# Patient Record
Sex: MALE | Race: OTHER | HISPANIC OR LATINO | Employment: FULL TIME | URBAN - METROPOLITAN AREA
[De-identification: names, ages, dates, MRNs, and addresses within clinical notes are randomized per-mention and may not be internally consistent; named-entity substitution may affect disease eponyms.]

---

## 2017-11-21 ENCOUNTER — APPOINTMENT (EMERGENCY)
Dept: RADIOLOGY | Facility: HOSPITAL | Age: 52
DRG: 638 | End: 2017-11-21
Payer: COMMERCIAL

## 2017-11-21 ENCOUNTER — APPOINTMENT (INPATIENT)
Dept: MRI IMAGING | Facility: HOSPITAL | Age: 52
DRG: 638 | End: 2017-11-21
Payer: COMMERCIAL

## 2017-11-21 ENCOUNTER — HOSPITAL ENCOUNTER (INPATIENT)
Facility: HOSPITAL | Age: 52
LOS: 7 days | Discharge: HOME HEALTH | DRG: 638 | End: 2017-11-28
Attending: EMERGENCY MEDICINE | Admitting: INTERNAL MEDICINE
Payer: COMMERCIAL

## 2017-11-21 DIAGNOSIS — R73.9 HYPERGLYCEMIA: ICD-10-CM

## 2017-11-21 DIAGNOSIS — E11.621 DIABETIC FOOT ULCER (HCC): Primary | ICD-10-CM

## 2017-11-21 DIAGNOSIS — M86.9 OSTEOMYELITIS (HCC): ICD-10-CM

## 2017-11-21 DIAGNOSIS — I10 HYPERTENSION: ICD-10-CM

## 2017-11-21 DIAGNOSIS — L97.509 DIABETIC FOOT ULCER (HCC): Primary | ICD-10-CM

## 2017-11-21 DIAGNOSIS — N17.9 ACUTE RENAL FAILURE (ARF) (HCC): ICD-10-CM

## 2017-11-21 PROBLEM — E78.5 HYPERLIPIDEMIA: Status: ACTIVE | Noted: 2017-11-21

## 2017-11-21 PROBLEM — E66.9 OBESITY: Status: ACTIVE | Noted: 2017-11-21

## 2017-11-21 PROBLEM — S99.929A FOOT INJURY: Status: ACTIVE | Noted: 2017-11-21

## 2017-11-21 PROBLEM — E11.9 DIABETES MELLITUS (HCC): Status: ACTIVE | Noted: 2017-11-21

## 2017-11-21 LAB
ANION GAP BLD CALC-SCNC: 14 MMOL/L (ref 4–13)
ANION GAP SERPL CALCULATED.3IONS-SCNC: 8 MMOL/L (ref 4–13)
BACTERIA UR QL AUTO: NORMAL /HPF
BASOPHILS # BLD AUTO: 0.07 THOUSANDS/ΜL (ref 0–0.1)
BASOPHILS NFR BLD AUTO: 1 % (ref 0–1)
BILIRUB UR QL STRIP: NEGATIVE
BUN BLD-MCNC: 25 MG/DL (ref 5–25)
BUN SERPL-MCNC: 17 MG/DL (ref 5–25)
CA-I BLD-SCNC: 1.18 MMOL/L (ref 1.12–1.32)
CALCIUM SERPL-MCNC: 9.3 MG/DL (ref 8.3–10.1)
CHLORIDE BLD-SCNC: 100 MMOL/L (ref 100–108)
CHLORIDE SERPL-SCNC: 99 MMOL/L (ref 100–108)
CLARITY UR: CLEAR
CO2 SERPL-SCNC: 27 MMOL/L (ref 21–32)
COLOR UR: ABNORMAL
CREAT BLD-MCNC: 1.6 MG/DL (ref 0.6–1.3)
CREAT SERPL-MCNC: 1.73 MG/DL (ref 0.6–1.3)
EOSINOPHIL # BLD AUTO: 0.45 THOUSAND/ΜL (ref 0–0.61)
EOSINOPHIL NFR BLD AUTO: 4 % (ref 0–6)
ERYTHROCYTE [DISTWIDTH] IN BLOOD BY AUTOMATED COUNT: 13.7 % (ref 11.6–15.1)
GFR SERPL CREATININE-BSD FRML MDRD: 44 ML/MIN/1.73SQ M
GFR SERPL CREATININE-BSD FRML MDRD: 49 ML/MIN/1.73SQ M
GLUCOSE SERPL-MCNC: 191 MG/DL (ref 65–140)
GLUCOSE SERPL-MCNC: 341 MG/DL (ref 65–140)
GLUCOSE SERPL-MCNC: 354 MG/DL (ref 65–140)
GLUCOSE UR STRIP-MCNC: ABNORMAL MG/DL
HCT VFR BLD AUTO: 36.5 % (ref 36.5–49.3)
HCT VFR BLD CALC: 38 % (ref 36.5–49.3)
HGB BLD-MCNC: 11.4 G/DL (ref 12–17)
HGB BLDA-MCNC: 12.9 G/DL (ref 12–17)
HGB UR QL STRIP.AUTO: NEGATIVE
INR PPP: 1.09 (ref 0.86–1.16)
KETONES UR STRIP-MCNC: NEGATIVE MG/DL
LEUKOCYTE ESTERASE UR QL STRIP: ABNORMAL
LYMPHOCYTES # BLD AUTO: 2.09 THOUSANDS/ΜL (ref 0.6–4.47)
LYMPHOCYTES NFR BLD AUTO: 18 % (ref 14–44)
MCH RBC QN AUTO: 27.6 PG (ref 26.8–34.3)
MCHC RBC AUTO-ENTMCNC: 31.2 G/DL (ref 31.4–37.4)
MCV RBC AUTO: 88 FL (ref 82–98)
MONOCYTES # BLD AUTO: 0.55 THOUSAND/ΜL (ref 0.17–1.22)
MONOCYTES NFR BLD AUTO: 5 % (ref 4–12)
NEUTROPHILS # BLD AUTO: 8.31 THOUSANDS/ΜL (ref 1.85–7.62)
NEUTS SEG NFR BLD AUTO: 72 % (ref 43–75)
NITRITE UR QL STRIP: NEGATIVE
NON-SQ EPI CELLS URNS QL MICRO: NORMAL /HPF
NRBC BLD AUTO-RTO: 0 /100 WBCS
PCO2 BLD: 27 MMOL/L (ref 21–32)
PH UR STRIP.AUTO: 6.5 [PH] (ref 4.5–8)
PLATELET # BLD AUTO: 347 THOUSANDS/UL (ref 149–390)
PLATELET # BLD AUTO: 368 THOUSANDS/UL (ref 149–390)
PMV BLD AUTO: 10 FL (ref 8.9–12.7)
PMV BLD AUTO: 9.9 FL (ref 8.9–12.7)
POTASSIUM BLD-SCNC: 5.1 MMOL/L (ref 3.5–5.3)
POTASSIUM SERPL-SCNC: 4.4 MMOL/L (ref 3.5–5.3)
PROT UR STRIP-MCNC: NEGATIVE MG/DL
PROTHROMBIN TIME: 14.3 SECONDS (ref 12.1–14.4)
RBC # BLD AUTO: 4.13 MILLION/UL (ref 3.88–5.62)
RBC #/AREA URNS AUTO: NORMAL /HPF
SODIUM BLD-SCNC: 136 MMOL/L (ref 136–145)
SODIUM SERPL-SCNC: 134 MMOL/L (ref 136–145)
SP GR UR STRIP.AUTO: 1.01 (ref 1–1.03)
SPECIMEN SOURCE: ABNORMAL
TSH SERPL DL<=0.05 MIU/L-ACNC: 1.93 UIU/ML (ref 0.36–3.74)
UROBILINOGEN UR QL STRIP.AUTO: 0.2 E.U./DL
WBC # BLD AUTO: 11.51 THOUSAND/UL (ref 4.31–10.16)
WBC #/AREA URNS AUTO: NORMAL /HPF

## 2017-11-21 PROCEDURE — 80047 BASIC METABLC PNL IONIZED CA: CPT

## 2017-11-21 PROCEDURE — 87040 BLOOD CULTURE FOR BACTERIA: CPT | Performed by: EMERGENCY MEDICINE

## 2017-11-21 PROCEDURE — 80048 BASIC METABOLIC PNL TOTAL CA: CPT | Performed by: EMERGENCY MEDICINE

## 2017-11-21 PROCEDURE — 96365 THER/PROPH/DIAG IV INF INIT: CPT

## 2017-11-21 PROCEDURE — 81001 URINALYSIS AUTO W/SCOPE: CPT | Performed by: EMERGENCY MEDICINE

## 2017-11-21 PROCEDURE — 85014 HEMATOCRIT: CPT

## 2017-11-21 PROCEDURE — 83036 HEMOGLOBIN GLYCOSYLATED A1C: CPT | Performed by: NURSE PRACTITIONER

## 2017-11-21 PROCEDURE — 87040 BLOOD CULTURE FOR BACTERIA: CPT | Performed by: NURSE PRACTITIONER

## 2017-11-21 PROCEDURE — 82948 REAGENT STRIP/BLOOD GLUCOSE: CPT

## 2017-11-21 PROCEDURE — 85025 COMPLETE CBC W/AUTO DIFF WBC: CPT | Performed by: EMERGENCY MEDICINE

## 2017-11-21 PROCEDURE — 84443 ASSAY THYROID STIM HORMONE: CPT | Performed by: NURSE PRACTITIONER

## 2017-11-21 PROCEDURE — 85049 AUTOMATED PLATELET COUNT: CPT | Performed by: NURSE PRACTITIONER

## 2017-11-21 PROCEDURE — 36415 COLL VENOUS BLD VENIPUNCTURE: CPT | Performed by: EMERGENCY MEDICINE

## 2017-11-21 PROCEDURE — 87081 CULTURE SCREEN ONLY: CPT | Performed by: NURSE PRACTITIONER

## 2017-11-21 PROCEDURE — 73718 MRI LOWER EXTREMITY W/O DYE: CPT

## 2017-11-21 PROCEDURE — C51N1ZZ PLANAR NUCLEAR MEDICINE IMAGING OF RIGHT UPPER EXTREMITY VEINS USING TECHNETIUM 99M (TC-99M): ICD-10-PCS | Performed by: RADIOLOGY

## 2017-11-21 PROCEDURE — 73630 X-RAY EXAM OF FOOT: CPT

## 2017-11-21 PROCEDURE — 85610 PROTHROMBIN TIME: CPT | Performed by: NURSE PRACTITIONER

## 2017-11-21 RX ORDER — INSULIN GLARGINE 100 [IU]/ML
30 INJECTION, SOLUTION SUBCUTANEOUS
Status: DISCONTINUED | OUTPATIENT
Start: 2017-11-21 | End: 2017-11-21

## 2017-11-21 RX ORDER — ONDANSETRON 2 MG/ML
4 INJECTION INTRAMUSCULAR; INTRAVENOUS EVERY 6 HOURS PRN
Status: DISCONTINUED | OUTPATIENT
Start: 2017-11-21 | End: 2017-11-28 | Stop reason: HOSPADM

## 2017-11-21 RX ORDER — SACCHAROMYCES BOULARDII 250 MG
250 CAPSULE ORAL 2 TIMES DAILY
Status: DISCONTINUED | OUTPATIENT
Start: 2017-11-21 | End: 2017-11-28 | Stop reason: HOSPADM

## 2017-11-21 RX ORDER — SODIUM CHLORIDE 9 MG/ML
125 INJECTION, SOLUTION INTRAVENOUS CONTINUOUS
Status: DISCONTINUED | OUTPATIENT
Start: 2017-11-21 | End: 2017-11-26

## 2017-11-21 RX ORDER — HEPARIN SODIUM 5000 [USP'U]/ML
5000 INJECTION, SOLUTION INTRAVENOUS; SUBCUTANEOUS EVERY 8 HOURS SCHEDULED
Status: DISCONTINUED | OUTPATIENT
Start: 2017-11-21 | End: 2017-11-28 | Stop reason: HOSPADM

## 2017-11-21 RX ORDER — INSULIN GLARGINE 100 [IU]/ML
24 INJECTION, SOLUTION SUBCUTANEOUS
Status: DISCONTINUED | OUTPATIENT
Start: 2017-11-21 | End: 2017-11-26

## 2017-11-21 RX ORDER — VANCOMYCIN HYDROCHLORIDE 1 G/200ML
12.5 INJECTION, SOLUTION INTRAVENOUS EVERY 12 HOURS
Status: DISCONTINUED | OUTPATIENT
Start: 2017-11-22 | End: 2017-11-21

## 2017-11-21 RX ADMIN — SODIUM CHLORIDE 1000 ML: 0.9 INJECTION, SOLUTION INTRAVENOUS at 17:15

## 2017-11-21 RX ADMIN — INSULIN LISPRO 15 UNITS: 100 INJECTION, SOLUTION INTRAVENOUS; SUBCUTANEOUS at 17:25

## 2017-11-21 RX ADMIN — Medication 250 MG: at 22:55

## 2017-11-21 RX ADMIN — SODIUM CHLORIDE 125 ML/HR: 0.9 INJECTION, SOLUTION INTRAVENOUS at 22:04

## 2017-11-21 RX ADMIN — CEFEPIME HYDROCHLORIDE 2000 MG: 2 INJECTION, SOLUTION INTRAVENOUS at 16:32

## 2017-11-21 RX ADMIN — HEPARIN SODIUM 5000 UNITS: 5000 INJECTION, SOLUTION INTRAVENOUS; SUBCUTANEOUS at 22:47

## 2017-11-21 RX ADMIN — INSULIN GLARGINE 24 UNITS: 100 INJECTION, SOLUTION SUBCUTANEOUS at 22:48

## 2017-11-21 RX ADMIN — CEFAZOLIN SODIUM 2000 MG: 2 SOLUTION INTRAVENOUS at 22:45

## 2017-11-21 RX ADMIN — INSULIN LISPRO 2 UNITS: 100 INJECTION, SOLUTION INTRAVENOUS; SUBCUTANEOUS at 22:55

## 2017-11-21 RX ADMIN — VANCOMYCIN HYDROCHLORIDE 1250 MG: 5 INJECTION, POWDER, LYOPHILIZED, FOR SOLUTION INTRAVENOUS at 17:17

## 2017-11-21 NOTE — H&P
History and Physical - [facility]    Patient Information: Murlean Nyhan 46 y o  male MRN: [id number]  Unit/Bed#: ED 09 Encounter: [id number]  Admitting Physician: JAS Mathur  PCP: Deep Trevino MD  Date of Admission:  11/21/17    Assessment/Plan:    Hospital Problem List:     Principal Problem: Foot injury  Active Problems:    Diabetes mellitus ([facility] )    Hypertension    Hyperlipidemia    Obesity    Acute kidney injury ([facility] )      Plan for the Primary Problem(s):  · Foot injury:  Punctured wound of plantar aspect of 3rd toe  Onset 9 days ago, 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  · Podiatry consult  · See plan for cellulitis  Plan for Additional Problems:   · Cellulitis:  Right foot, including 2nd, 3rd, 4th, 5th toes secondary to infected puncture wound into the setting of neuropathy and uncontrolled diabetes  Foot x-ray reviewed  Will consider MRI rule out osteomyelitis  Noted mild leukocytosis  · Pt does Not meet SIRS criteria  · Failed outpatient therapy of Keflex, per patient he was started on Keflex with no improvement  · Podiatry consulted for further management and evaluation  · I will consult ID for more input  · Blood cultures pending  · Continue for coverage vancomycin an  cefepime pending MRSA  Diabetes mellitus type 2 insulin dependent: obesity:   Uncontrolled  With complications, neuropathy, LOKESH, he reports blurred vision at times  Patient appears to be noncompliant  He reports has not check his blood sugar in over 2 months  He report noncompliance with dietary intake and lifestyle modification  This was strongly encourage to monitor food intake and weight management  · Nutrition will be consulted  · Monitor blood sugar for times per day  With coverage and long-acting insulin  · Will also monitor A1c  Acute kidney injury:  Present on admission secondary to uncontrolled diabetes    Will initiate gentle hydration if not improved by morning will consider consult of Nephrology  Patient Unknown baseline  Will continue to monitor kidney function  Obesity:  Weight loss therapeutic lifestyle nutritional management encourage  Hyperlipidemia:  Will monitor lipid panel  Hypertension:  Blood pressure elevated will initiate Norvasc and hydralazine  VTE Prophylaxis: Heparin  / sequential compression device   Code Status: Full Code  POLST: There is no POLST form on file for this patient (pre-hospital)    Anticipated Length of Stay:  Patient will be admitted on an Inpatient basis with an anticipated length of stay of greater than 2 midnights  Justification for Hospital Stay:, infected punctured wound,  Cellulitis of right lower extremity, IV antibiotics    Total Time for Visit, including Counseling / Coordination of Care: 1 hour  Greater than 50% of this total time spent on direct patient counseling and coordination of care  Chief Complaint:  Right foot injury    History of Present Illness:    Nisha Carter is a 46 y o , obese male with history of uncontrolled diabetes type 2 insulin dependent, hypertension, hyperlipidemia who presents/sent by his Podiatrist, Dr Bryanna Perry for evaluation of foot injury  Patient report about 9 days ago a 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 punctured his 3rd toe  Patient seen by his PCP was started on Keflex with no improvement  Today he went to see his podiatrist and was told to come to the hospital for further evaluation  He complains of fever and chills  As stated above patient is a noncompliant diabetic  Does not monitor his blood sugar on a regular basis  His wife reports he is noncompliant with meals he has no physical activities  Patient reports he thinks his last A1c was 9  Otherwise denies dizziness, lightheadedness, chest pain, shortness of breath, dysuria, flank pain, he reports fever and chills      Review of Systems:    Review of Systems Constitutional: Positive for chills and fever  Obese   Musculoskeletal: Positive for gait problem  Skin: Positive for wound (Punctured wound)  Right foot erythema  All other systems reviewed and are negative  Past Medical and Surgical History:     Past Medical History:   Diagnosis Date    Diabetes mellitus ([address] )     GERD (gastroesophageal reflux disease)     Hyperlipidemia     Hypertension     Neuropathy        History reviewed  No pertinent surgical history  Meds/Allergies:    Prior to Admission medications    Not on File     I have reviewed home medications with patient personally  Allergies: No Known Allergies    Social History:     Marital Status: /Civil Union   Occupation: Disabled  Patient Pre-hospital Living Situation: Home with family  Patient Pre-hospital Level of Mobility: Independent  Patient Pre-hospital Diet Restrictions: He reports none  Substance Use History:   History   Alcohol Use No     History   Smoking Status    Never Smoker   Smokeless Tobacco    Never Used     History   Drug Use No       Family History:    Father with Cardiac and DM    Physical Exam:     Vitals:   Blood Pressure: (!) 185/95 (11/21/17 1708)  Pulse: 80 (11/21/17 1708)  Temperature: 98 6 °F (37 °C) (11/21/17 1708)  Temp Source: Oral (11/21/17 1708)  Respirations: 19 (11/21/17 1708)  Height: 5' 9" (175 3 cm) (11/21/17 1333)  Weight - Scale: 104 kg (230 lb) (11/21/17 1333)  SpO2: 96 % (11/21/17 1708)    Physical Exam    Additional Data:     Lab Results: I have personally reviewed pertinent reports          Results from last 7 days  Lab Units 11/21/17  1641 11/21/17  1629   WBC Thousand/uL  --  11 51*   HEMOGLOBIN g/dL  --  11 4*   I STAT HEMOGLOBIN g/dl 12 9  --    HEMATOCRIT %  --  36 5   PLATELETS Thousands/uL  --  368   NEUTROS PCT %  --  72   LYMPHS PCT %  --  18   MONOS PCT %  --  5   EOS PCT %  --  4       Results from last 7 days  Lab Units 11/21/17  1641 11/21/17  1629   SODIUM mmol/L  --  134*   POTASSIUM mmol/L  --  4 4   CHLORIDE mmol/L  --  99*   CO2 mmol/L  --  27   BUN mg/dL  --  17   CREATININE mg/dL  --  1 73*   CALCIUM mg/dL  --  9 3   GLUCOSE RANDOM mg/dL  --  354*   GLUCOSE, ISTAT mg/dl 341*  --            Imaging: I have personally reviewed pertinent reports  Xr Foot 3+ Views Right    Result Date: 11/21/2017  Narrative: RIGHT FOOT INDICATION:  Injury, nail through foot  COMPARISON: None VIEWS:  3 IMAGES:  3 FINDINGS: Evaluation of the 2nd, 3rd, 4th and 5th toes is somewhat suboptimal due to flexion and overlying bandage material   There are no findings for acute fracture or osseous destruction  There is an old healed fracture of the 3rd metatarsal distally with bony callus formation  There is question of a lucency in the soft tissues at the 3rd toe which may be related to soft tissue laceration  Gas forming soft tissue infection is not excluded  Correlate clinically  Degenerative spurring present at the 1st metatarsophalangeal joint with subchondral cyst formation  Plantar and retrocalcaneal heel spurs identified  There is also spurring at the tarsal metatarsal joints  Dense vascular calcifications are seen  Impression: 1  Evaluation of the 2nd, 3rd, 4th and 5th toes is somewhat suboptimal due to flexion and overlying bandage material   There are no findings for acute fracture or osseous destruction  No radiographic findings for osteomyelitis  If there is clinical concern for osteomyelitis, consider follow-up with MRI or three-phase bone scan  2   Question of a lucency in the soft tissues at the 3rd toe which may be related to soft tissue laceration  Gas forming soft tissue infection is not excluded  Correlate clinically  Return report was sent to the ER   Workstation performed: [id number]       EKG, Pathology, and Other Studies Reviewed on Admission:   · EKG: Normal Sinus     Allscripts Records Reviewed: Yes     ** Please Note: Dragon 360 Dictation voice to text software may have been used in the creation of this document   **

## 2017-11-21 NOTE — ED PROVIDER NOTES
History  Chief Complaint   Patient presents with    Foot Injury     Pt c/o right foot injury when a nail went through foot  Pt has been seen by foot doctor but not getting better  Pt had abx treament as well  Doctor told him to come here where she can be consulted  HPI  61-year-old male presents emergency department with chief complaint of right foot pain  He stepped on a nail 7th 10 days ago and has not sought care until recently  He states that he was on antibiotics per his podiatrist and his foot has gotten worse  He is diabetic    None       Past Medical History:   Diagnosis Date    Diabetes mellitus ([address] )     GERD (gastroesophageal reflux disease)     Hyperlipidemia     Hypertension     Neuropathy        History reviewed  No pertinent surgical history  History reviewed  No pertinent family history  I have reviewed and agree with the history as documented  Social History   Substance Use Topics    Smoking status: Never Smoker    Smokeless tobacco: Never Used    Alcohol use No        Review of Systems   Constitutional: Negative  HENT: Negative  Respiratory: Negative  Cardiovascular: Negative  Gastrointestinal: Negative  Genitourinary: Negative  Skin: Positive for rash and wound  All other systems reviewed and are negative  Physical Exam  ED Triage Vitals [11/21/17 1332]   Temperature Pulse Respirations Blood Pressure SpO2   98 7 °F (37 1 °C) 79 20 (!) 206/99 98 %      Temp Source Heart Rate Source Patient Position - Orthostatic VS BP Location FiO2 (%)   Oral Monitor Sitting Left arm --      Pain Score       No Pain           Orthostatic Vital Signs  Vitals:    11/21/17 1332 11/21/17 1708   BP: (!) 206/99 (!) 185/95   Pulse: 79 80   Patient Position - Orthostatic VS: Sitting Lying       Physical Exam   Constitutional: He is oriented to person, place, and time  He appears well-developed and well-nourished  HENT:   Head: Normocephalic and atraumatic     Eyes: Conjunctivae and EOM are normal  Pupils are equal, round, and reactive to light  Neck: Normal range of motion  Cardiovascular: Normal rate and regular rhythm  Pulmonary/Chest: Effort normal    Abdominal: Soft  Musculoskeletal:        Feet:    Dehiscent wound between the 3rd and 4th toe extending proximally between 3rd and 4th metatarsal   There is surrounding erythema with cellulitic skin changes on the dorsum of the foot  There is also edema on the dorsum of the foot  Foot is tender to pal   Neurological: He is alert and oriented to person, place, and time  Skin: Rash noted  Nursing note and vitals reviewed  ED Medications  Medications   vancomycin (VANCOCIN) 1,250 mg in sodium chloride 0 9 % 250 mL IVPB (1,250 mg Intravenous New Bag 11/21/17 1717)   sodium chloride 0 9 % bolus 1,000 mL (1,000 mL Intravenous New Bag 11/21/17 1715)   sodium chloride 0 9 % infusion (not administered)   cefepime (MAXIPIME) IVPB (premix) 2,000 mg (0 mg Intravenous Stopped 11/21/17 1712)   insulin lispro (HumaLOG) 100 units/mL subcutaneous injection 15 Units (15 Units Subcutaneous Given 11/21/17 1725)       Diagnostic Studies  Results Reviewed     Procedure Component Value Units Date/Time    Basic metabolic panel [[id number]]  (Abnormal) Collected:  11/21/17 1629    Lab Status:  Final result Specimen:  Blood from Arm, Right Updated:  11/21/17 1648     Sodium 134 (L) mmol/L      Potassium 4 4 mmol/L      Chloride 99 (L) mmol/L      CO2 27 mmol/L      Anion Gap 8 mmol/L      BUN 17 mg/dL      Creatinine 1 73 (H) mg/dL      Glucose 354 (H) mg/dL      Calcium 9 3 mg/dL      eGFR 44 ml/min/1 73sq m     Narrative:         National Kidney Disease Education Program recommendations are as follows:  GFR calculation is accurate only with a steady state creatinine  Chronic Kidney disease less than 60 ml/min/1 73 sq  meters  Kidney failure less than 15 ml/min/1 73 sq  meters      POCT Chem 8+ [[id number]]  (Abnormal) Collected: 11/21/17 1641    Lab Status:  Final result Updated:  11/21/17 1646     SODIUM, I-STAT 136 mmol/l      Potassium, i-STAT 5 1 mmol/L      Chloride, istat 100 mmol/L      CO2, i-STAT 27 mmol/L      Anion Gap, Istat 14 (H) mmol/L      Calcium, Ionized i-STAT 1 18 mmol/L      BUN, I-STAT 25 mg/dl      Creatinine, i-STAT 1 6 (H) mg/dl      eGFR 49 ml/min/1 73sq m      Glucose, i-STAT 341 (H) mg/dl      Hct, i-STAT 38 %      Hgb, i-STAT 12 9 g/dl      Specimen Type VENOUS    CBC and differential [[id number]]  (Abnormal) Collected:  11/21/17 1629    Lab Status:  Final result Specimen:  Blood from Arm, Right Updated:  11/21/17 1638     WBC 11 51 (H) Thousand/uL      RBC 4 13 Million/uL      Hemoglobin 11 4 (L) g/dL      Hematocrit 36 5 %      MCV 88 fL      MCH 27 6 pg      MCHC 31 2 (L) g/dL      RDW 13 7 %      MPV 10 0 fL      Platelets 801 Thousands/uL      nRBC 0 /100 WBCs      Neutrophils Relative 72 %      Lymphocytes Relative 18 %      Monocytes Relative 5 %      Eosinophils Relative 4 %      Basophils Relative 1 %      Neutrophils Absolute 8 31 (H) Thousands/µL      Lymphocytes Absolute 2 09 Thousands/µL      Monocytes Absolute 0 55 Thousand/µL      Eosinophils Absolute 0 45 Thousand/µL      Basophils Absolute 0 07 Thousands/µL     Blood culture #2 [[id number]] Collected:  11/21/17 1633    Lab Status: In process Specimen:  Blood from Arm, Left Updated:  11/21/17 1638    Blood culture #1 [[id number]] Collected:  11/21/17 1629    Lab Status: In process Specimen:  Blood from Arm, Right Updated:  11/21/17 1633    UA w Reflex to Microscopic [[id number]]     Lab Status:  No result Specimen:  Urine                  XR foot 3+ views RIGHT   ED Interpretation by Luisa Tao DO (11/21 1642)   Recent periosteum surrounding 3rd metatarsal consistent with osteomyelitis      Final Result by Cindy Duarte MD (11/21 1659)      1    Evaluation of the 2nd, 3rd, 4th and 5th toes is somewhat suboptimal due to flexion and overlying bandage material   There are no findings for acute fracture or osseous destruction  No radiographic findings for osteomyelitis  If there is clinical    concern for osteomyelitis, consider follow-up with MRI or three-phase bone scan  2   Question of a lucency in the soft tissues at the 3rd toe which may be related to soft tissue laceration  Gas forming soft tissue infection is not excluded  Correlate clinically  Return report was sent to the ER  Workstation performed: [id number]                    Procedures  Procedures       Phone Contacts  ED Phone Contact    ED Course  ED Course         Podiatrist, Dr Aline Ybarra, referred patient here per patient  I attempted reaching patient through her office twice and could not reach her  The  service does not have a phone number for paging  I placed routine consult for her  MDM  Number of Diagnoses or Management Options  Acute renal failure (ARF) ([address] ): new and requires workup  Diabetic foot ulcer ([address] ): new and requires workup  Hyperglycemia: established and worsening  Hypertension: established and worsening  Diagnosis management comments: CBC, BMP were ordered and hyperglycemia was evident  There is no evidence of acidosis  Blood cultures were also ordered prior to antibiotic administration for dehiscent wound and likely osteomyelitis  Vancomycin and cefepime were ordered  In addition, 1 L normal saline and 15 units of Humalog were given to address is hyperglycemia  X-ray of right foot revealed elevation of periosteum along 3rd metatarsal consistent with osteomyelitis         Amount and/or Complexity of Data Reviewed  Clinical lab tests: ordered and reviewed  Tests in the radiology section of CPT®: ordered and reviewed      CritCare Time    Disposition  Final diagnoses:   Diabetic foot ulcer ([address] )   Acute renal failure (ARF) ([address] )   Hyperglycemia   Hypertension   Osteomyelitis ([address] )     Time reflects when diagnosis was documented in both MDM as applicable and the Disposition within this note     Time User Action Codes Description Comment    11/21/2017  5:11 PM Leartis Christofer Add [M27 434,  L97 509] Diabetic foot ulcer ([facility] )     11/21/2017  5:12 PM Nappe, Shelby Corderos Add [N17 9] Acute renal failure (ARF) ([facility] )     11/21/2017  5:12 PM Nappe, Kenlucia Corderos Add [R73 9] Hyperglycemia     11/21/2017  5:12 PM Nappe, Kenlucia Chiles Add [I10] Hypertension     11/21/2017  5:58 PM Nappe, Kenlucia Chiles Add [M86 9] [facility]       ED Disposition     ED Disposition Condition Comment    Admit  Case was discussed with general medicne and the patient's admission status was agreed to be Admission Status: inpatient status to the service of Dr Margot Snowden          Follow-up Information    None       Patient's Medications    No medications on file     No discharge procedures on file      ED Provider  Electronically Signed by           Willam Sousa DO  11/21/17 5478

## 2017-11-22 PROBLEM — L03.031 CELLULITIS AND ABSCESS OF TOE OF RIGHT FOOT: Status: ACTIVE | Noted: 2017-11-22

## 2017-11-22 PROBLEM — L02.611 CELLULITIS AND ABSCESS OF TOE OF RIGHT FOOT: Status: ACTIVE | Noted: 2017-11-22

## 2017-11-22 LAB
ANION GAP SERPL CALCULATED.3IONS-SCNC: 9 MMOL/L (ref 4–13)
BUN SERPL-MCNC: 13 MG/DL (ref 5–25)
CALCIUM SERPL-MCNC: 8.6 MG/DL (ref 8.3–10.1)
CHLORIDE SERPL-SCNC: 106 MMOL/L (ref 100–108)
CHOLEST SERPL-MCNC: 129 MG/DL (ref 50–200)
CO2 SERPL-SCNC: 24 MMOL/L (ref 21–32)
CREAT SERPL-MCNC: 1.35 MG/DL (ref 0.6–1.3)
ERYTHROCYTE [DISTWIDTH] IN BLOOD BY AUTOMATED COUNT: 13.4 % (ref 11.6–15.1)
EST. AVERAGE GLUCOSE BLD GHB EST-MCNC: 220 MG/DL
GFR SERPL CREATININE-BSD FRML MDRD: 60 ML/MIN/1.73SQ M
GLUCOSE SERPL-MCNC: 147 MG/DL (ref 65–140)
GLUCOSE SERPL-MCNC: 159 MG/DL (ref 65–140)
GLUCOSE SERPL-MCNC: 170 MG/DL (ref 65–140)
GLUCOSE SERPL-MCNC: 196 MG/DL (ref 65–140)
GLUCOSE SERPL-MCNC: 260 MG/DL (ref 65–140)
HBA1C MFR BLD: 9.3 % (ref 4.2–6.3)
HCT VFR BLD AUTO: 34.1 % (ref 36.5–49.3)
HDLC SERPL-MCNC: 27 MG/DL (ref 40–60)
HGB BLD-MCNC: 10.7 G/DL (ref 12–17)
LDLC SERPL CALC-MCNC: 74 MG/DL (ref 0–100)
MAGNESIUM SERPL-MCNC: 2.1 MG/DL (ref 1.6–2.6)
MCH RBC QN AUTO: 27.6 PG (ref 26.8–34.3)
MCHC RBC AUTO-ENTMCNC: 31.4 G/DL (ref 31.4–37.4)
MCV RBC AUTO: 88 FL (ref 82–98)
PLATELET # BLD AUTO: 340 THOUSANDS/UL (ref 149–390)
PMV BLD AUTO: 10 FL (ref 8.9–12.7)
POTASSIUM SERPL-SCNC: 3.9 MMOL/L (ref 3.5–5.3)
RBC # BLD AUTO: 3.88 MILLION/UL (ref 3.88–5.62)
SODIUM SERPL-SCNC: 139 MMOL/L (ref 136–145)
TRIGL SERPL-MCNC: 139 MG/DL
WBC # BLD AUTO: 10.68 THOUSAND/UL (ref 4.31–10.16)

## 2017-11-22 PROCEDURE — 80061 LIPID PANEL: CPT | Performed by: NURSE PRACTITIONER

## 2017-11-22 PROCEDURE — 85027 COMPLETE CBC AUTOMATED: CPT | Performed by: NURSE PRACTITIONER

## 2017-11-22 PROCEDURE — 83735 ASSAY OF MAGNESIUM: CPT | Performed by: NURSE PRACTITIONER

## 2017-11-22 PROCEDURE — 82948 REAGENT STRIP/BLOOD GLUCOSE: CPT

## 2017-11-22 PROCEDURE — 99285 EMERGENCY DEPT VISIT HI MDM: CPT

## 2017-11-22 PROCEDURE — 87186 SC STD MICRODIL/AGAR DIL: CPT | Performed by: PODIATRIST

## 2017-11-22 PROCEDURE — 87147 CULTURE TYPE IMMUNOLOGIC: CPT | Performed by: PODIATRIST

## 2017-11-22 PROCEDURE — 87070 CULTURE OTHR SPECIMN AEROBIC: CPT | Performed by: PODIATRIST

## 2017-11-22 PROCEDURE — 80048 BASIC METABOLIC PNL TOTAL CA: CPT | Performed by: NURSE PRACTITIONER

## 2017-11-22 PROCEDURE — 36415 COLL VENOUS BLD VENIPUNCTURE: CPT | Performed by: NURSE PRACTITIONER

## 2017-11-22 PROCEDURE — 87205 SMEAR GRAM STAIN: CPT | Performed by: PODIATRIST

## 2017-11-22 RX ORDER — METRONIDAZOLE 500 MG/1
500 TABLET ORAL EVERY 8 HOURS SCHEDULED
Status: DISCONTINUED | OUTPATIENT
Start: 2017-11-22 | End: 2017-11-28 | Stop reason: HOSPADM

## 2017-11-22 RX ORDER — METOPROLOL TARTRATE 50 MG/1
50 TABLET, FILM COATED ORAL ONCE
Status: DISCONTINUED | OUTPATIENT
Start: 2017-11-22 | End: 2017-11-28 | Stop reason: HOSPADM

## 2017-11-22 RX ADMIN — METRONIDAZOLE 500 MG: 500 TABLET ORAL at 22:04

## 2017-11-22 RX ADMIN — Medication 250 MG: at 17:06

## 2017-11-22 RX ADMIN — CEFAZOLIN SODIUM 2000 MG: 2 SOLUTION INTRAVENOUS at 06:01

## 2017-11-22 RX ADMIN — METRONIDAZOLE 500 MG: 500 TABLET ORAL at 16:50

## 2017-11-22 RX ADMIN — Medication 250 MG: at 08:52

## 2017-11-22 RX ADMIN — HEPARIN SODIUM 5000 UNITS: 5000 INJECTION, SOLUTION INTRAVENOUS; SUBCUTANEOUS at 14:23

## 2017-11-22 RX ADMIN — INSULIN LISPRO 2 UNITS: 100 INJECTION, SOLUTION INTRAVENOUS; SUBCUTANEOUS at 17:04

## 2017-11-22 RX ADMIN — INSULIN LISPRO 6 UNITS: 100 INJECTION, SOLUTION INTRAVENOUS; SUBCUTANEOUS at 08:51

## 2017-11-22 RX ADMIN — INSULIN GLARGINE 24 UNITS: 100 INJECTION, SOLUTION SUBCUTANEOUS at 22:05

## 2017-11-22 RX ADMIN — METOPROLOL TARTRATE 25 MG: 50 TABLET, FILM COATED ORAL at 16:50

## 2017-11-22 RX ADMIN — INSULIN LISPRO 1 UNITS: 100 INJECTION, SOLUTION INTRAVENOUS; SUBCUTANEOUS at 11:58

## 2017-11-22 RX ADMIN — METOPROLOL TARTRATE 25 MG: 25 TABLET, FILM COATED ORAL at 17:00

## 2017-11-22 RX ADMIN — INSULIN LISPRO 3 UNITS: 100 INJECTION, SOLUTION INTRAVENOUS; SUBCUTANEOUS at 22:05

## 2017-11-22 RX ADMIN — CEFEPIME HYDROCHLORIDE 2000 MG: 2 INJECTION, SOLUTION INTRAVENOUS at 16:50

## 2017-11-22 RX ADMIN — HEPARIN SODIUM 5000 UNITS: 5000 INJECTION, SOLUTION INTRAVENOUS; SUBCUTANEOUS at 22:04

## 2017-11-22 RX ADMIN — HEPARIN SODIUM 5000 UNITS: 5000 INJECTION, SOLUTION INTRAVENOUS; SUBCUTANEOUS at 06:01

## 2017-11-22 NOTE — CONSULTS
Consultation - Infectious Disease   Grant Piña 46 y o  male MRN: [id number]  Unit/Bed#: ANTHONY Encounter: [id number]      IMPRESSION & RECOMMENDATIONS:   Impression/Recommendations: This is a 46 y o  male, with noncompliant dm, suffered a nail puncture wound to his foot right foot 10 days ago  He did not seek care at 1st but was initially placed on Keflex  He is now admitted for worsening foot infection  He has no fever but has mild leukocytosis  He is clinically and systemically well, without sepsis or systemic toxicity  1   Right foot cellulitis, secondary to dirty nail puncture wound  In this kind of injury, pathogens involved include skin pathogens, environmental pathogens on the nail, and Pseudomonas residing in the sole of shoes  His antibiotic regimen will need to be broadened to cover these pathogens  Fortunately, patient is clinically and systemically well, without sepsis or systemic toxicity  Change antibiotic to high-dose IV cefepime and p o  Flagyl  Follow up on wound culture  Follow-up on blood cultures  Serial foot exam     2   Probable osteomyelitis of right 3rd toe  With the dirty nail puncture injury, there is high probability of osteomyelitis  MRI findings are equivocal but certainly suggestive of osteomyelitis  Podiatry evaluation noted  Plan for Ceretec noted  If osteomyelitis is present, probability of cure with antibiotic alone is extremely low in this noncompliant DM patient  In this case, I would recommend bone resection/amputation  Follow-up on Ceretec scan  Recommend bone resection (toe amputation versus ray resection versus TMA) if osteomyelitis is present  3   Dirty nail puncture injury  Pathogen considerations above  Patient is also at high risk for tetanus  He did receive TD booster last week  4   LOKESH, superimposed on CKD  This is most likely secondary to acute infection  Creatinine is improved overnight    Antibiotic dosages adjusted accordingly  Monitor creatinine  5   Noncompliant DM  Hemoglobin A1c on admission is 9 3  This clearly contributes to infection development  This will also contribute to poor healing potential   Management per Medicine Service  Discussed with patient in great detail regarding the need to control blood sugar to avoid future infections  6   Neuropathy  This, together with noncompliance, puts patient at very high risk for future limb loss from injuries  Discussed with patient in detail regarding the above plan  Discussed with Dr Venus Tao from [facility] service earlier  Thank you for this consultation  We will follow along with you  HISTORY OF PRESENT ILLNESS:  Reason for Consult:  Right foot cellulitis  HPI: Cristal Decker is a 46 y o  male, with noncompliant DM, 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, 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, 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  Patient actually does not recall exactly when this event occur  His wife notice today on his foot 10 days ago  Patient has neuropathy and has no sensation is foot  In any event, patient did not seek see Care initially  He had a routine appointment with his PCP 2 days later  He was started on Keflex  Due to lack of improvement, patient saw his podiatrist yesterday  He was directly referred to the ER for admission  On presentation, patient had mild leukocytosis but no fever  He was admitted and started on IV cefazolin  We asked to evaluate the patient  As stated above, patient has not compliant DM  He does not monitor his blood sugar  He has diet indiscretion  He does not take his medications  Patient states that he has had neuropathy for many years  Patient denies any prior infection or surgery in his right foot  Patient did receive tetanus booster at his PCP's office last week  Patient denies any fever/chills at home      REVIEW OF SYSTEMS:  A complete 12 point system-based review of systems is otherwise negative  PAST MEDICAL HISTORY:  Past Medical History:   Diagnosis Date    Diabetes mellitus ([address] )     GERD (gastroesophageal reflux disease)     Hyperlipidemia     Hypertension     Neuropathy      History reviewed  No pertinent surgical history  Problem list reviewed  FAMILY HISTORY:  Non-contributory    SOCIAL HISTORY:  History   Alcohol Use No     History   Drug Use No     History   Smoking Status    Never Smoker   Smokeless Tobacco    Never Used       ALLERGIES:  No Known Allergies    MEDICATIONS:  All current active medications have been reviewed  Patient is currently on IV cefazolin  PHYSICAL EXAM:  Vitals:  HR:  [75-81] 81  Resp:  [13-21] 18  BP: (162-192)/(81-95) 177/90  SpO2:  [96 %-98 %] 98 %  Temp (24hrs), Av 8 °F (37 1 °C), Min:98 6 °F (37 °C), Max:98 9 °F (37 2 °C)  Current: Temperature: 98 9 °F (37 2 °C)     Physical Exam:  General:  Well-nourished, well-developed, in no acute distress  Awake, alert and oriented x 3  Eyes:  Conjunctive clear with no hemorrhages or effusions  Oropharynx:  No ulcers, no lesions, pharynx benign, no tonsillitis  Neck:  Supple, no lymphadenopathy, no mass, nontender  Lungs:  Expansion symmetric, no rales, no wheezing, no accessory muscle use  Cardiac:  Regular rate and rhythm, normal S1, normal S2, no murmurs  Abdomen:  Soft, nondistended, non-tender, no HSM  Extremities:  Right foot with mild edema distally  There is an ulcer had right 3rd space with some undermining  Mild surrounding erythema  No purulence  No fluctuance  No tenderness  Skin:  No rashes, no ulcers  Neurological:  Moves all four extremities spontaneously, decreased sensation    LABS, IMAGING, & OTHER STUDIES:  Lab Results:  I have personally reviewed pertinent labs      Results from last 7 days  Lab Units 17  0449 17  1641 17  1629   SODIUM mmol/L 139  --  134*   POTASSIUM mmol/L 3 9  --  4 4   CHLORIDE mmol/L 106  --  99*   CO2 mmol/L 24  --  27   ANION GAP mmol/L 9  --  8   BUN mg/dL 13  --  17   CREATININE mg/dL 1 35*  --  1 73*   EGFR ml/min/1 73sq m 60 49 44   GLUCOSE RANDOM mg/dL 159*  --  354*   GLUCOSE, ISTAT mg/dl  --  341*  --    CALCIUM mg/dL 8 6  --  9 3       Results from last 7 days  Lab Units 11/22/17  0448 11/21/17  2147 11/21/17  1641 11/21/17  1629   WBC Thousand/uL 10 68*  --   --  11 51*   HEMOGLOBIN g/dL 10 7*  --   --  11 4*   I STAT HEMOGLOBIN g/dl  --   --  12 9  --    PLATELETS Thousands/uL 340 347  --  368           Imaging Studies:   I have personally reviewed pertinent imaging study reports and images in PACS  Right foot x-ray reviewed personally  No obvious bony abnormalities  Right foot MRI reviewed personally  There is increased marrow edema in right 3rd through 5th toes  EKG, Pathology, and Other Studies:   I have personally reviewed pertinent reports

## 2017-11-22 NOTE — PLAN OF CARE
Problem: PAIN - ADULT  Goal: Verbalizes/displays adequate comfort level or baseline comfort level  Interventions:  - Encourage patient to monitor pain and request assistance  - Assess pain using appropriate pain scale  - Administer analgesics based on type and severity of pain and evaluate response  - Implement non-pharmacological measures as appropriate and evaluate response  - Consider cultural and social influences on pain and pain management  - Notify physician/advanced practitioner if interventions unsuccessful or patient reports new pain   Outcome: Progressing      Problem: INFECTION - ADULT  Goal: Absence or prevention of progression during hospitalization  INTERVENTIONS:  - Assess and monitor for signs and symptoms of infection  - Monitor lab/diagnostic results  - Monitor all insertion sites, i e  indwelling lines, tubes, and drains  - Monitor endotracheal (as able) and nasal secretions for changes in amount and color  - [place] appropriate cooling/warming therapies per order  - Administer medications as ordered  - Instruct and encourage patient and family to use good hand hygiene technique  - Identify and instruct in appropriate isolation precautions for identified infection/condition   Outcome: Progressing    Goal: Absence of fever/infection during neutropenic period  INTERVENTIONS:  - Monitor WBC  - Implement neutropenic guidelines   Outcome: Progressing      Problem: SAFETY ADULT  Goal: Patient will remain free of falls  INTERVENTIONS:  - Assess patient frequently for physical needs  -  Identify cognitive and physical deficits and behaviors that affect risk of falls    -  [place] fall precautions as indicated by assessment   - Educate patient/family on patient safety including physical limitations  - Instruct patient to call for assistance with activity based on assessment  - Modify environment to reduce risk of injury  - Consider OT/PT consult to assist with strengthening/mobility    Outcome: Progressing    Goal: Maintain or return to baseline ADL function  INTERVENTIONS:  -  Assess patient's ability to carry out ADLs; assess patient's baseline for ADL function and identify physical deficits which impact ability to perform ADLs (bathing, care of mouth/teeth, toileting, grooming, dressing, etc )  - Assess/evaluate cause of self-care deficits   - Assess range of motion  - Assess patient's mobility; develop plan if impaired  - Assess patient's need for assistive devices and provide as appropriate  - Encourage maximum independence but intervene and supervise when necessary  ¯ Involve family in performance of ADLs  ¯ Assess for home care needs following discharge   ¯ Request OT consult to assist with ADL evaluation and planning for discharge  ¯ Provide patient education as appropriate   Outcome: Progressing    Goal: Maintain or return mobility status to optimal level  INTERVENTIONS:  - Assess patient's baseline mobility status (ambulation, transfers, stairs, etc )    - Identify cognitive and physical deficits and behaviors that affect mobility  - Identify mobility aids required to assist with transfers and/or ambulation (gait belt, sit-to-stand, lift, walker, cane, etc )  - [place] fall precautions as indicated by assessment  - Record patient progress and toleration of activity level on Mobility SBAR; progress patient to next Phase/Stage  - Instruct patient to call for assistance with activity based on assessment  - Request Rehabilitation consult to assist with strengthening/weightbearing, etc    Outcome: Progressing      Problem: DISCHARGE PLANNING  Goal: Discharge to home or other facility with appropriate resources  INTERVENTIONS:  - Identify barriers to discharge w/patient and caregiver  - Arrange for needed discharge resources and transportation as appropriate  - Identify discharge learning needs (meds, wound care, etc )  - Arrange for interpretive services to assist at discharge as needed  - Refer to Case Management Department for coordinating discharge planning if the patient needs post-hospital services based on physician/advanced practitioner order or complex needs related to functional status, cognitive ability, or social support system   Outcome: Progressing      Problem: Knowledge Deficit  Goal: Patient/family/caregiver demonstrates understanding of disease process, treatment plan, medications, and discharge instructions  Complete learning assessment and assess knowledge base    Interventions:  - Provide teaching at level of understanding  - Provide teaching via preferred learning methods   Outcome: Progressing      Problem: SKIN/TISSUE INTEGRITY - ADULT  Goal: Skin integrity remains intact  INTERVENTIONS  - Identify patients at risk for skin breakdown  - Assess and monitor skin integrity  - Assess and monitor nutrition and hydration status  - Monitor labs (i e  albumin)  - Assess for incontinence   - Turn and reposition patient  - Assist with mobility/ambulation  - Relieve pressure over bony prominences  - Avoid friction and shearing  - Provide appropriate hygiene as needed including keeping skin clean and dry  - Evaluate need for skin moisturizer/barrier cream  - Collaborate with interdisciplinary team (i e  Nutrition, Rehabilitation, etc )   - Patient/family teaching   Outcome: Progressing    Goal: Incision(s), wounds(s) or drain site(s) healing without S/S of infection  INTERVENTIONS  - Assess and document risk factors for skin impairment   - Assess and document dressing, incision, wound bed, drain sites and surrounding tissue  - Initiate Nutrition services consult and/or wound management as needed   Outcome: Progressing    Goal: Oral mucous membranes remain intact  INTERVENTIONS  - Assess oral mucosa and hygiene practices  - Implement preventative oral hygiene regimen  - Implement oral medicated treatments as ordered  - Initiate Nutrition services referral as needed   Outcome: Progressing      Problem: Potential for Falls  Goal: Patient will remain free of falls  INTERVENTIONS:  - Assess patient frequently for physical needs  -  Identify cognitive and physical deficits and behaviors that affect risk of falls    -  [place] fall precautions as indicated by assessment   - Educate patient/family on patient safety including physical limitations  - Instruct patient to call for assistance with activity based on assessment  - Modify environment to reduce risk of injury  - Consider OT/PT consult to assist with strengthening/mobility    Outcome: Progressing      Problem: METABOLIC, FLUID AND ELECTROLYTES - ADULT  Goal: Glucose maintained within target range  INTERVENTIONS:  - Monitor Blood Glucose as ordered  - Assess for signs and symptoms of hyperglycemia and hypoglycemia  - Administer ordered medications to maintain glucose within target range  - Assess nutritional intake and initiate nutrition service referral as needed  Outcome: Progressing

## 2017-11-22 NOTE — ASSESSMENT & PLAN NOTE
· Type 2 insulin dependent  · On control, A1c 9 3  · As per patient's wife yesterday patient is not cognizant of his dietary intake  · Will continue monitor, continue Lantus and SSI

## 2017-11-22 NOTE — PLAN OF CARE
DISCHARGE PLANNING     Discharge to home or other facility with appropriate resources Progressing        INFECTION - ADULT     Absence or prevention of progression during hospitalization Progressing     Absence of fever/infection during neutropenic period Progressing        Knowledge Deficit     Patient/family/caregiver demonstrates understanding of disease process, treatment plan, medications, and discharge instructions Progressing        PAIN - ADULT     Verbalizes/displays adequate comfort level or baseline comfort level Progressing        SAFETY ADULT     Patient will remain free of falls Progressing     Maintain or return to baseline ADL function Progressing     Maintain or return mobility status to optimal level Progressing        SKIN/TISSUE INTEGRITY - ADULT     Skin integrity remains intact Progressing     Incision(s), wounds(s) or drain site(s) healing without S/S of infection Progressing     Oral mucous membranes remain intact Progressing

## 2017-11-22 NOTE — ASSESSMENT & PLAN NOTE
· Present on admission, likely secondary to uncontrolled DM  · Creatinine improving from 1 73 to 1 35    · Continue gentle hydration
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
## 2017-11-22 NOTE — ASSESSMENT & PLAN NOTE
· Right foot, including 2nd, 3rd, 4th and 5th toes  Does not meet SIRS requirement  · Secondary to right foot injury due to puncture plantar 3rd toe wound and Tinea Pedia  In the setting of uncontrolled diabetes insulin dependent, neuropathy  Failed Keflex therapy which was started at initial onset of punctured wound about 10 days ago  · Blood, wound and MRAS cultures pending  · Podiatry consultation appreciated  Wound assessed, copious amounts of purulent drainage  Pending bone scan  · MRI reviewed, Diffuse subcutaneous edema, [place] edema throughout portions of the 3rd through 5th toes most prominent within the 3rd/4th proximal and middle phalanges   These areas may be present on a posttraumatic or infectious basis as there is also associated abnormal T1 signal   I favor an infectious cause if the trauma was not recent  · Pending ID consult  · Continue ancef IV pending cultures  · Continue to monitor Leukocytosis

## 2017-11-22 NOTE — CASE MANAGEMENT
[facility] in the [facility] by Fran Garcia for 2017  Network Utilization Review Department  Phone: [phone]; Fax [phone]  ATTENTION: The Network Utilization Review Department is now centralized for our 7 Facilities  Make a note that we have a new phone and fax numbers for our Department  Please call with any questions or concerns to [phone] and carefully follow the prompts so that you are directed to the right person  All voicemails are confidential  Fax any determinations, approvals, denials, and requests for initial or continue stay review clinical to [phone]  Due to HIGH CALL volume, it would be easier if you could please send faxed requests to expedite your requests and in part, help us provide discharge notifications faster  Initial Clinical Review    Admission: Date/Time/Statement: 11/21/17 @ 1713     Orders Placed This Encounter   Procedures    Inpatient Admission (expected length of stay for this patient is greater than two midnights)     Standing Status:   Standing     Number of Occurrences:   1     Order Specific Question:   Admitting Physician     Answer:   Angelita Shaw [[id number]]     Order Specific Question:   Level of Care     Answer:   Med Surg [[id number]]     Order Specific Question:   Estimated length of stay     Answer:   More than 2 Midnights     Order Specific Question:   Certification     Answer:   I certify that inpatient services are medically necessary for this patient for a duration of greater than two midnights  See H&P and MD Progress Notes for additional information about the patient's course of treatment           ED: Date/Time/Mode of Arrival:   ED Arrival Information     Expected Arrival Acuity Means of Arrival Escorted By Service Admission Type    - 11/21/2017 12:51 Urgent Walk-In Spouse General Medicine Urgent    Arrival Complaint    Foot Injury          Chief Complaint:   Chief Complaint   Patient presents with   [facility] Foot Injury     Pt c/o right foot injury when a nail went through foot  Pt has been seen by foot doctor but not getting better  Pt had abx treament as well  Doctor told him to come here where she can be consulted  History of Illness:    Denisse Miller is a 46 y o , obese male with history of uncontrolled diabetes type 2 insulin dependent, hypertension, hyperlipidemia who presents/sent by his Podiatrist, Dr Stephanie Baker for evaluation of foot injury  Patient report about 9 days ago a 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 punctured his 3rd toe  Patient seen by his PCP was started on Keflex with no improvement  Today he went to see his podiatrist and was told to come to the hospital for further evaluation  He complains of fever and chills  As stated above patient is a noncompliant diabetic  Does not monitor his blood sugar on a regular basis  His wife reports he is noncompliant with meals he has no physical activities  Patient reports he thinks his last A1c was 9  Otherwise denies dizziness, lightheadedness, chest pain, shortness of breath, dysuria, flank pain, he reports fever and chills  Per discussion with CRNP, keflex was started 9 days ago       ED Vital Signs:   ED Triage Vitals [11/21/17 1332]   Temperature Pulse Respirations Blood Pressure SpO2   98 7 °F (37 1 °C) 79 20 (!) 206/99 98 %      Temp Source Heart Rate Source Patient Position - Orthostatic VS BP Location FiO2 (%)   Oral Monitor Sitting Left arm --      Pain Score       No Pain        Wt Readings from Last 1 Encounters:   11/21/17 104 kg (230 lb)       Vital Signs (abnormal):   Date/Time Temp Pulse Resp BP SpO2 O2 Device Patient Position - Orthostatic VS   11/22/17 0400 -- 78 17  183/94 98 % -- --   11/22/17 0000 -- 75 16  178/84 96 % None (Room air) Lying   11/21/17 2215 98 9 °F (37 2 °C) 80 21  181/90 96 % None (Room air) Lying   11/21/17 2100 98 8 °F (37 1 °C) 80 19  192/93 96 % None (Room air) Lying 11/21/17 1708 98 6 °F (37 °C) 80 19  185/95 96 % None (Room air) Lying       Abnormal Labs/Diagnostic Test Results:   11/21: na 134   Cl 99   Creat 1 73   Gluc 354, 191   Wbc 11 51   hgb 11 4  UA: >=1000 gluc   leuks elevated  bld c&s pending  R foot: 1   Evaluation of the 2nd, 3rd, 4th and 5th toes is somewhat suboptimal due to flexion and overlying bandage material  Elsy Paddock are no findings for acute fracture or osseous destruction   No radiographic findings for osteomyelitis  If there is clinical   concern for osteomyelitis, consider follow-up with MRI or three-phase bone scan  2   Question of a lucency in the soft tissues at the 3rd toe which may be related to soft tissue laceration   Gas forming soft tissue infection is not excluded   Correlate clinicall    MRI R foot:Diffuse subcutaneous edema predominantly along the dorsal aspect of the forefoot  No definite loculated collection to indicate an abscess  Marrow edema throughout portions of the 3rd through 5th toes most prominent within the 3rd/4th proximal and middle phalanges   These areas may be present on a posttraumatic or infectious basis as there is also associated abnormal T1 signal   I favor an infectious cause if the trauma was not recent      11/22: wbc 10 68   hgb 10 7   hct 34 1   Creat 1 35   Gluc 159, 147    ED Treatment:   Medication Administration from 11/21/2017 1251 to 11/22/2017 2437       Date/Time Order Dose Route Action Action by Comments     11/21/2017 1632 cefepime (MAXIPIME) IVPB (premix) 2,000 mg 2,000 mg Intravenous New Bag Ashley Muro RN      11/21/2017 1717 vancomycin (VANCOCIN) 1,250 mg in sodium chloride 0 9 % 250 mL IVPB 1,250 mg Intravenous New Bag Ashley Muro RN      11/21/2017 1715 sodium chloride 0 9 % bolus 1,000 mL 1,000 mL Intravenous [address] Ashley Muro RN      11/21/2017 1725 insulin lispro (HumaLOG) 100 units/mL subcutaneous injection 15 Units 15 Units Subcutaneous Given Bharath Pichardo RN      11/21/2017 2204 sodium chloride 0 9 % infusion 125 mL/hr Intravenous [address] Jj Flores [facility]      11/21/2017 2255 insulin lispro (HumaLOG) 100 units/mL subcutaneous injection 1-6 Units 2 Units Subcutaneous Given Jj Flores RN      11/22/2017 0601 ceFAZolin (ANCEF) IVPB (premix) 2,000 mg 2,000 mg Intravenous New Bag Jj Flores RN      11/21/2017 2245 ceFAZolin (ANCEF) IVPB (premix) 2,000 mg 2,000 mg Intravenous New [address] Jj Flores RN      11/21/2017 2248 insulin glargine (LANTUS) subcutaneous injection 24 Units 24 Units Subcutaneous Given Jj Flores RN           Past Medical/Surgical History: Active Ambulatory Problems     Diagnosis Date Noted    No Active Ambulatory Problems     Resolved Ambulatory Problems     Diagnosis Date Noted    No Resolved Ambulatory Problems     Past Medical History:   Diagnosis Date    Diabetes mellitus ([facility] [address] )     GERD (gastroesophageal reflux disease)     Hyperlipidemia     Hypertension     Neuropathy        Admitting Diagnosis: Foot injury [[id number] [id number]]    Age/Sex: 46 y o  male    Assessment/Plan: Principal Problem: Foot injury  Active Problems:  Diabetes mellitus ([facility] [address] )    Hypertension    Hyperlipidemia    Obesity    Acute kidney injury (HCC)      Plan for the Primary Problem(s):  · Foot injury:  Punctured wound of plantar aspect of 3rd toe  Onset 9 days ago, 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 Podiatry consult  ? See plan for cellulitis     Plan for Additional Problems:   · Cellulitis:  Right foot, including 2nd, 3rd, 4th, 5th toes secondary to infected puncture wound into the setting of neuropathy and uncontrolled diabetes  Foot x-ray reviewed  Will consider MRI rule out osteomyelitis  Noted mild leukocytosis  ? Pt does Not meet SIRS criteria  ? Failed outpatient therapy of Keflex, per patient he was started on Keflex with no improvement  ? Podiatry consulted for further management and evaluation    ? I will consult ID for more input   ? Blood cultures pending  ? Continue for coverage vancomycin an  cefepime pending MRSA      Diabetes mellitus type 2 insulin dependent: obesity:   Uncontrolled  With complications, neuropathy, LOKESH, he reports blurred vision at times  Patient appears to be noncompliant  He reports has not check his blood sugar in over 2 months  He report noncompliance with dietary intake and lifestyle modification  This was strongly encourage to monitor food intake and weight management  · Nutrition will be consulted  · Monitor blood sugar for times per day  With coverage and long-acting insulin  · Will also monitor A1c      Acute kidney injury:  Present on admission secondary to uncontrolled diabetes  Will initiate gentle hydration if not improved by morning will consider consult of Nephrology  Patient Unknown baseline  Will continue to monitor kidney function      Obesity:  Weight loss therapeutic lifestyle nutritional management encourage      Hyperlipidemia:  Will monitor lipid panel      Hypertension:  Blood pressure elevated will initiate Norvasc and hydralazine         VTE Prophylaxis: Heparin  / sequential compression device   Code Status: Full Code  POLST: There is no POLST form on file for this patient (pre-hospital)     Anticipated Length of Stay:  Patient will be admitted on an Inpatient basis with an anticipated length of stay of greater than 2 midnights     Justification for Hospital Stay:, infected punctured wound,  Cellulitis of right lower extremity, IV antibiotics    Admission Orders:  Scheduled Meds:   cefazolin 2,000 mg Intravenous Q8H   Empagliflozin 1 tablet Oral Daily   heparin (porcine) 5,000 Units Subcutaneous Q8H [address]   insulin glargine 24 Units Subcutaneous HS   insulin lispro 1-6 Units Subcutaneous TID AC   insulin lispro 1-6 Units Subcutaneous HS   insulin lispro 6 Units Subcutaneous Daily With Breakfast   saccharomyces boulardii 250 mg Oral BID     Continuous Infusions:   sodium chloride 125 mL/hr Last Rate: 125 mL/hr (11/21/17 5063)     PRN Meds: ondansetron    OOB as gumaro  Gluc checks ac / hs  Wet to wet dressing to wound R foot  Cons ID  Cons podiatry   Elevate RLE  SCD's  Tele

## 2017-11-22 NOTE — ASSESSMENT & PLAN NOTE
· In the setting of Uncontrolled Diabetes insulin dependent  · Weight loss and therapeutic lifestyle encouraged

## 2017-11-23 LAB
BASOPHILS # BLD AUTO: 0.07 THOUSANDS/ΜL (ref 0–0.1)
BASOPHILS NFR BLD AUTO: 1 % (ref 0–1)
EOSINOPHIL # BLD AUTO: 0.53 THOUSAND/ΜL (ref 0–0.61)
EOSINOPHIL NFR BLD AUTO: 5 % (ref 0–6)
ERYTHROCYTE [DISTWIDTH] IN BLOOD BY AUTOMATED COUNT: 13.5 % (ref 11.6–15.1)
EST. AVERAGE GLUCOSE BLD GHB EST-MCNC: 171 MG/DL
GLUCOSE SERPL-MCNC: 130 MG/DL (ref 65–140)
GLUCOSE SERPL-MCNC: 143 MG/DL (ref 65–140)
GLUCOSE SERPL-MCNC: 193 MG/DL (ref 65–140)
HBA1C MFR BLD: 7.6 % (ref 4.2–6.3)
HCT VFR BLD AUTO: 34.2 % (ref 36.5–49.3)
HGB BLD-MCNC: 10.8 G/DL (ref 12–17)
LYMPHOCYTES # BLD AUTO: 2.58 THOUSANDS/ΜL (ref 0.6–4.47)
LYMPHOCYTES NFR BLD AUTO: 25 % (ref 14–44)
MCH RBC QN AUTO: 27.6 PG (ref 26.8–34.3)
MCHC RBC AUTO-ENTMCNC: 31.6 G/DL (ref 31.4–37.4)
MCV RBC AUTO: 88 FL (ref 82–98)
MONOCYTES # BLD AUTO: 0.52 THOUSAND/ΜL (ref 0.17–1.22)
MONOCYTES NFR BLD AUTO: 5 % (ref 4–12)
MRSA NOSE QL CULT: NORMAL
NEUTROPHILS # BLD AUTO: 6.69 THOUSANDS/ΜL (ref 1.85–7.62)
NEUTS SEG NFR BLD AUTO: 64 % (ref 43–75)
NRBC BLD AUTO-RTO: 0 /100 WBCS
PLATELET # BLD AUTO: 354 THOUSANDS/UL (ref 149–390)
PMV BLD AUTO: 10 FL (ref 8.9–12.7)
RBC # BLD AUTO: 3.91 MILLION/UL (ref 3.88–5.62)
WBC # BLD AUTO: 10.45 THOUSAND/UL (ref 4.31–10.16)

## 2017-11-23 PROCEDURE — 83036 HEMOGLOBIN GLYCOSYLATED A1C: CPT | Performed by: INTERNAL MEDICINE

## 2017-11-23 PROCEDURE — 82948 REAGENT STRIP/BLOOD GLUCOSE: CPT

## 2017-11-23 PROCEDURE — 82043 UR ALBUMIN QUANTITATIVE: CPT | Performed by: PHYSICIAN ASSISTANT

## 2017-11-23 PROCEDURE — 82570 ASSAY OF URINE CREATININE: CPT | Performed by: PHYSICIAN ASSISTANT

## 2017-11-23 PROCEDURE — 85025 COMPLETE CBC W/AUTO DIFF WBC: CPT | Performed by: NURSE PRACTITIONER

## 2017-11-23 RX ORDER — HYDRALAZINE HYDROCHLORIDE 20 MG/ML
5 INJECTION INTRAMUSCULAR; INTRAVENOUS EVERY 6 HOURS PRN
Status: DISCONTINUED | OUTPATIENT
Start: 2017-11-23 | End: 2017-11-28 | Stop reason: HOSPADM

## 2017-11-23 RX ORDER — METOPROLOL TARTRATE 50 MG/1
50 TABLET, FILM COATED ORAL EVERY 12 HOURS SCHEDULED
Status: DISCONTINUED | OUTPATIENT
Start: 2017-11-23 | End: 2017-11-28 | Stop reason: HOSPADM

## 2017-11-23 RX ADMIN — Medication 250 MG: at 08:42

## 2017-11-23 RX ADMIN — INSULIN LISPRO 2 UNITS: 100 INJECTION, SOLUTION INTRAVENOUS; SUBCUTANEOUS at 22:51

## 2017-11-23 RX ADMIN — INSULIN GLARGINE 24 UNITS: 100 INJECTION, SOLUTION SUBCUTANEOUS at 22:48

## 2017-11-23 RX ADMIN — METRONIDAZOLE 500 MG: 500 TABLET ORAL at 14:22

## 2017-11-23 RX ADMIN — INSULIN LISPRO 6 UNITS: 100 INJECTION, SOLUTION INTRAVENOUS; SUBCUTANEOUS at 08:47

## 2017-11-23 RX ADMIN — METOPROLOL TARTRATE 25 MG: 50 TABLET, FILM COATED ORAL at 08:42

## 2017-11-23 RX ADMIN — SODIUM CHLORIDE 125 ML/HR: 0.9 INJECTION, SOLUTION INTRAVENOUS at 06:48

## 2017-11-23 RX ADMIN — CEFEPIME HYDROCHLORIDE 2000 MG: 2 INJECTION, SOLUTION INTRAVENOUS at 17:34

## 2017-11-23 RX ADMIN — Medication 250 MG: at 17:37

## 2017-11-23 RX ADMIN — HEPARIN SODIUM 5000 UNITS: 5000 INJECTION, SOLUTION INTRAVENOUS; SUBCUTANEOUS at 14:22

## 2017-11-23 RX ADMIN — METRONIDAZOLE 500 MG: 500 TABLET ORAL at 22:49

## 2017-11-23 RX ADMIN — METOPROLOL TARTRATE 50 MG: 50 TABLET, FILM COATED ORAL at 22:48

## 2017-11-23 RX ADMIN — METRONIDAZOLE 500 MG: 500 TABLET ORAL at 05:19

## 2017-11-23 RX ADMIN — HEPARIN SODIUM 5000 UNITS: 5000 INJECTION, SOLUTION INTRAVENOUS; SUBCUTANEOUS at 05:19

## 2017-11-23 RX ADMIN — SODIUM CHLORIDE 125 ML/HR: 0.9 INJECTION, SOLUTION INTRAVENOUS at 14:29

## 2017-11-23 RX ADMIN — CEFEPIME HYDROCHLORIDE 2000 MG: 2 INJECTION, SOLUTION INTRAVENOUS at 03:25

## 2017-11-23 RX ADMIN — ONDANSETRON 4 MG: 2 INJECTION INTRAMUSCULAR; INTRAVENOUS at 06:48

## 2017-11-23 RX ADMIN — HEPARIN SODIUM 5000 UNITS: 5000 INJECTION, SOLUTION INTRAVENOUS; SUBCUTANEOUS at 22:48

## 2017-11-23 NOTE — ASSESSMENT & PLAN NOTE
· Uncontrolled, type 2 insulin-dependent diabetic with A1c 9 3% this admission  · Continue Lantus, consistent carb diet, sliding-scale insulin with meals; blood glucose on review better today, range is 130-60  · Will check urine microalbumin, patient would benefit from addition of ACE-inhibitor medication  · Lipid profile is reasonable: total 129, LDL 74, HDL 27, , however given the diabetes he is recommended to be on statin medication  Will discuss initiating low-dose Crestor for the patient

## 2017-11-23 NOTE — PROGRESS NOTES
Progress Note - Cristal Decker 46 y o  male MRN: [id number]    Unit/Bed#: -01 Encounter: [id number]        * Foot injury   Assessment & Plan    · Puncture wound prior to admission, see treatment plan below        Cellulitis and abscess of toe of right foot   Assessment & Plan    · Secondary to right foot injury with puncture wound to the 3rd toe, tinea pedis, diabetes with neuropathy  Cellulitis including 2nd, 3rd, 4th and 5th toes  Does not meet SIRS requirement  Failed Keflex outpatient  · Podiatry and Infectious Disease following, agree with cefepime for pseudomonal coverage and Flagyl for environmental pathogen  Patient is status post debridement, await NM bone scan which will likely occur tomorrow  · Blood culture NGTD x 24 hours, MRSA pending, wound culture pending  · Foot x-ray/MRI equivocal         Acute kidney injury ([facility] )   Assessment & Plan    · Present on admission, likely secondary to uncontrolled DM and underlying cellulitis  Improved from 1 73-1 35 with IV hydration  Will recheck BMP in a m  Diabetes mellitus ([facility] )   Assessment & Plan    · Uncontrolled, type 2 insulin-dependent diabetic with A1c 9 3% this admission  · Continue Lantus, consistent carb diet, sliding-scale insulin with meals; blood glucose on review better today, range is 130-60  · Will check urine microalbumin, patient would benefit from addition of ACE-inhibitor medication  · Lipid profile is reasonable: total 129, LDL 74, HDL 27, , however given the diabetes he is recommended to be on statin medication  Will discuss initiating low-dose Crestor for the patient  Obesity   Assessment & Plan    · BMI 34 31, patient with obesity in the setting of uncontrolled diabetes  Will get nutrition consult, patient is encouraged for weight loss and therapeutic lifestyle changes along with adherence to diabetes diet            Hypertension   Assessment & Plan    · Blood pressure reviewed, uncontrolled at this time   Continue metoprolol, will increase to 50 mg as heart rate will tolerate increase in beta-blocker            VTE Pharmacologic Prophylaxis:   Pharmacologic: Heparin  Mechanical: Mechanical VTE prophylaxis in place  Patient Centered Rounds: I have performed bedside rounds with nursing staff today  Discussions with Specialists or Other Care Team Provider: podiatry and infectious disease notes reviewed   Education and Discussions with Family / Patient: d/w patient regarding IV antibiotics and possible need for amputation, he is very hesitant to accept amputation   Time Spent for Care: Approximately 25 minutes  More than 50% of total time spent on counseling and coordination of care as described above  Current Length of Stay: 2 day(s)  Current Patient Status: Inpatient   Certification Statement: The patient will continue to require additional inpatient hospital stay due to IV antibiotic treatment for cellulitis/puncture wound with concern for osteomyelitis; await NM bone scan    Discharge Plan:  Not medically cleared for discharge  Code Status: Level 1 - Full Code    CC: The foot feels good, able to walk  Subjective:   Patient seen sitting up in bed eating breakfast this morning  He denies pain in the foot, reports he is able to ambulate  Overall he thinks gotten better since he has been here  He denies subjective fevers or chills  Reports tolerating antibiotics well without any GI side effects  Patient reports he is not sure that he would take amputation if offered, he feels that his body and IV antibiotics can cure the infection  Objective:   Vitals:   Temp (24hrs), Av 6 °F (37 °C), Min:97 5 °F (36 4 °C), Max:99 °F (37 2 °C)    HR:  [78-85] 83  Resp:  [18] 18  BP: (123-188)/(67-94) 176/83  SpO2:  [95 %-98 %] 98 %  Body mass index is 34 31 kg/m²  Input and Output Summary (last 24 hours):        Intake/Output Summary (Last 24 hours) at 17 1216  Last data filed at 17 0801   Gross per 24 hour   Intake          4571 67 ml   Output             1400 ml   Net          3171 67 ml       Physical Exam:     Physical Exam   Constitutional: Vital signs are normal  He appears well-developed and well-nourished  No distress  Cardiovascular: Normal rate, regular rhythm, S1 normal, S2 normal, normal heart sounds and intact distal pulses  No murmur heard  No cyanosis to the digits   Pulmonary/Chest: Effort normal and breath sounds normal  No respiratory distress  He has no rhonchi  Abdominal: Soft  Bowel sounds are normal  He exhibits no distension  Musculoskeletal: He exhibits edema (R>L foot)  Skin:   Dressing to R foot, not removed at time of evaluation   Nursing note and vitals reviewed  Additional Data:   Labs:    Results from last 7 days  Lab Units 11/23/17  0516   WBC Thousand/uL 10 45*   HEMOGLOBIN g/dL 10 8*   HEMATOCRIT % 34 2*   PLATELETS Thousands/uL 354   NEUTROS PCT % 64   LYMPHS PCT % 25   MONOS PCT % 5   EOS PCT % 5       Results from last 7 days  Lab Units 11/22/17  0449   SODIUM mmol/L 139   POTASSIUM mmol/L 3 9   CHLORIDE mmol/L 106   CO2 mmol/L 24   BUN mg/dL 13   CREATININE mg/dL 1 35*   CALCIUM mg/dL 8 6   GLUCOSE RANDOM mg/dL 159*       Results from last 7 days  Lab Units 11/21/17  2147   INR  1 09       * I Have Reviewed All Lab Data Listed Above  * Additional Pertinent Lab Tests Reviewed:  All Labs Within Last 24 Hours Reviewed    Imaging:    Imaging Reports Reviewed Today Include: XR, MRI reports reviewed     Cultures:   Blood Culture:   Lab Results   Component Value Date    BLOODCX No Growth at 24 hrs  11/21/2017    BLOODCX No Growth at 24 hrs  11/21/2017    BLOODCX No Growth at 24 hrs  11/21/2017    BLOODCX No Growth at 24 hrs  11/21/2017     Urine Culture: No results found for: URINECX  Sputum Culture: No components found for: SPUTUMCX  Wound Culture: No results found for: WOUNDCULT    Last 24 Hours Medication List:     cefepime 2,000 mg Intravenous Q12H Empagliflozin 1 tablet Oral Daily   heparin (porcine) 5,000 Units Subcutaneous Q8H [address]   insulin glargine 24 Units Subcutaneous HS   insulin lispro 1-6 Units Subcutaneous TID AC   insulin lispro 1-6 Units Subcutaneous HS   insulin lispro 6 Units Subcutaneous Daily With Breakfast   metoprolol tartrate 50 mg Oral Once   metoprolol tartrate 50 mg Oral Q12H ELODIA   metroNIDAZOLE 500 mg Oral Q8H [address]   saccharomyces boulardii 250 mg Oral BID        Today, Patient Was Seen By: Tiffany Kaur PA-C    ** Please Note: Dragon 360 Dictation voice to text software may have been used in the creation of this document   **

## 2017-11-23 NOTE — ASSESSMENT & PLAN NOTE
· Present on admission, likely secondary to uncontrolled DM and underlying cellulitis  Improved from 1 73-1 35 with IV hydration  Will recheck BMP in a m

## 2017-11-23 NOTE — PLAN OF CARE
Problem: PAIN - ADULT  Goal: Verbalizes/displays adequate comfort level or baseline comfort level  Interventions:  - Encourage patient to monitor pain and request assistance  - Assess pain using appropriate pain scale  - Administer analgesics based on type and severity of pain and evaluate response  - Implement non-pharmacological measures as appropriate and evaluate response  - Consider cultural and social influences on pain and pain management  - Notify physician/advanced practitioner if interventions unsuccessful or patient reports new pain   Outcome: Progressing

## 2017-11-23 NOTE — PROGRESS NOTES
Progress Note - Denisse Miller 46 y o  male MRN: [id number]    Unit/Bed#: -01 Encounter: [id number]      Assessment:  1) Nonhealing diabetic foot wound  2) Cellulitis right foot     Plan:  1) Ceretec Scan is Pending, will be started tomorrow per Nuclear Medicine, based on those results will plan for either TMA vs ray resection vs debridement  - Discussed with Radiologist that Osteomyelitis cannot be confirmed by MRI secondary to recent injury  2) Dressed with DSD and betadine  3) IV antibiotics as per medicine and ID  4) Plan for OR debridement vs amputation Monday    Subjective:   Patient seen bedside resting comfortably  NAD  Afebrile  Objective:     Vitals: Blood pressure (!) 176/83, pulse 83, temperature 98 9 °F (37 2 °C), temperature source Oral, resp  rate 18, height 5' 9" (1 753 m), weight 105 kg (232 lb 5 8 oz), SpO2 98 %  ,Body mass index is 34 31 kg/m²        Intake/Output Summary (Last 24 hours) at 11/23/17 1158  Last data filed at 11/23/17 0648   Gross per 24 hour   Intake          4331 67 ml   Output             1000 ml   Net          3331 67 ml       Physical Exam: wound unchanged, ID macerations to remaining interspaces, no purulence, no fluctuence, no malodor, no POP, no sensation, diminished pulses, thin shiny skin     Invasive Devices     Peripheral Intravenous Line            Peripheral IV 11/21/17 Right Antecubital 2 days            Gram stain wound   Stain     2+ Polys      2+ Gram positive cocci in pairs      2+ Gram negative rods

## 2017-11-23 NOTE — ASSESSMENT & PLAN NOTE
· BMI 34 31, patient with obesity in the setting of uncontrolled diabetes  Will get nutrition consult, patient is encouraged for weight loss and therapeutic lifestyle changes along with adherence to diabetes diet

## 2017-11-23 NOTE — ASSESSMENT & PLAN NOTE
· Secondary to right foot injury with puncture wound to the 3rd toe, tinea pedis, diabetes with neuropathy  Cellulitis including 2nd, 3rd, 4th and 5th toes  Does not meet SIRS requirement  Failed Keflex outpatient  · Podiatry and Infectious Disease following, agree with cefepime for pseudomonal coverage and Flagyl for environmental pathogen  Patient is status post debridement, await NM bone scan which will likely occur tomorrow    · Blood culture NGTD x 24 hours, MRSA pending, wound culture pending  · Foot x-ray/MRI equivocal

## 2017-11-23 NOTE — ASSESSMENT & PLAN NOTE
· Blood pressure reviewed, uncontrolled at this time    Continue metoprolol, will increase to 50 mg as heart rate will tolerate increase in beta-blocker

## 2017-11-24 ENCOUNTER — APPOINTMENT (INPATIENT)
Dept: NUCLEAR MEDICINE | Facility: HOSPITAL | Age: 52
DRG: 638 | End: 2017-11-24
Payer: COMMERCIAL

## 2017-11-24 PROBLEM — M86.9 OSTEOMYELITIS (HCC): Status: ACTIVE | Noted: 2017-11-21

## 2017-11-24 LAB
CREAT UR-MCNC: 44.2 MG/DL
GLUCOSE SERPL-MCNC: 102 MG/DL (ref 65–140)
GLUCOSE SERPL-MCNC: 130 MG/DL (ref 65–140)
GLUCOSE SERPL-MCNC: 174 MG/DL (ref 65–140)
GLUCOSE SERPL-MCNC: 181 MG/DL (ref 65–140)
MICROALBUMIN UR-MCNC: 67.3 MG/L (ref 0–20)
MICROALBUMIN/CREAT 24H UR: 152 MG/G CREATININE (ref 0–30)

## 2017-11-24 PROCEDURE — 82948 REAGENT STRIP/BLOOD GLUCOSE: CPT

## 2017-11-24 RX ORDER — METOCLOPRAMIDE HYDROCHLORIDE 5 MG/ML
10 INJECTION INTRAMUSCULAR; INTRAVENOUS EVERY 6 HOURS PRN
Status: DISCONTINUED | OUTPATIENT
Start: 2017-11-24 | End: 2017-11-28 | Stop reason: HOSPADM

## 2017-11-24 RX ADMIN — INSULIN GLARGINE 24 UNITS: 100 INJECTION, SOLUTION SUBCUTANEOUS at 22:38

## 2017-11-24 RX ADMIN — HEPARIN SODIUM 5000 UNITS: 5000 INJECTION, SOLUTION INTRAVENOUS; SUBCUTANEOUS at 16:28

## 2017-11-24 RX ADMIN — METRONIDAZOLE 500 MG: 500 TABLET ORAL at 16:28

## 2017-11-24 RX ADMIN — Medication 250 MG: at 12:49

## 2017-11-24 RX ADMIN — METOPROLOL TARTRATE 50 MG: 50 TABLET, FILM COATED ORAL at 22:45

## 2017-11-24 RX ADMIN — METOPROLOL TARTRATE 50 MG: 50 TABLET, FILM COATED ORAL at 12:49

## 2017-11-24 RX ADMIN — CEFEPIME HYDROCHLORIDE 2000 MG: 2 INJECTION, SOLUTION INTRAVENOUS at 16:25

## 2017-11-24 RX ADMIN — ONDANSETRON 4 MG: 2 INJECTION INTRAMUSCULAR; INTRAVENOUS at 05:37

## 2017-11-24 RX ADMIN — INSULIN LISPRO 1 UNITS: 100 INJECTION, SOLUTION INTRAVENOUS; SUBCUTANEOUS at 08:04

## 2017-11-24 RX ADMIN — INSULIN LISPRO 1 UNITS: 100 INJECTION, SOLUTION INTRAVENOUS; SUBCUTANEOUS at 22:37

## 2017-11-24 RX ADMIN — CEFEPIME HYDROCHLORIDE 2000 MG: 2 INJECTION, SOLUTION INTRAVENOUS at 03:50

## 2017-11-24 RX ADMIN — INSULIN LISPRO 6 UNITS: 100 INJECTION, SOLUTION INTRAVENOUS; SUBCUTANEOUS at 08:05

## 2017-11-24 RX ADMIN — SODIUM CHLORIDE 125 ML/HR: 0.9 INJECTION, SOLUTION INTRAVENOUS at 08:02

## 2017-11-24 RX ADMIN — Medication 250 MG: at 18:25

## 2017-11-24 RX ADMIN — METRONIDAZOLE 500 MG: 500 TABLET ORAL at 05:37

## 2017-11-24 RX ADMIN — METOCLOPRAMIDE 10 MG: 5 INJECTION, SOLUTION INTRAMUSCULAR; INTRAVENOUS at 11:21

## 2017-11-24 RX ADMIN — METRONIDAZOLE 500 MG: 500 TABLET ORAL at 22:36

## 2017-11-24 RX ADMIN — HEPARIN SODIUM 5000 UNITS: 5000 INJECTION, SOLUTION INTRAVENOUS; SUBCUTANEOUS at 22:36

## 2017-11-24 RX ADMIN — SODIUM CHLORIDE 125 ML/HR: 0.9 INJECTION, SOLUTION INTRAVENOUS at 18:25

## 2017-11-24 NOTE — PROGRESS NOTES
Progress Note - Infectious Disease   Toñito Silence 46 y o  male MRN: [id number]  Unit/Bed#: -01 Encounter: [id number]      Impression/Recommendations:  1   Right foot cellulitis, secondary to dirty nail puncture wound   In this kind of injury, pathogens involved include skin pathogens, environmental pathogens on the nail, and Pseudomonas residing in the sole of shoes   Wound culture thus far is growing Staph aureus and GBS  However, there was GNR on Gram stain also   Patient is clinically improved and remains systemically well  Blood cultures remain negative  Continue high-dose IV cefepime and p o  Flagyl for now  Follow up on final wound culture  Follow-up on blood cultures  Serial foot exam      2   Probable osteomyelitis of right 3rd toe    MRI findings are equivocal   However, with the dirty nail puncture injury, there is high clinical probability of osteomyelitis    Ceretec scan in progress   If osteomyelitis is present, probability of cure with antibiotic alone is extremely low in this noncompliant DM patient   In this case, I would recommend bone resection/amputation  Follow-up on Ceretec scan  Recommend bone resection (toe amputation versus ray resection versus TMA) if osteomyelitis is present      3   Dirty nail puncture injury   Pathogen considerations above   Patient is also at high risk for tetanus   He did receive TD booster last week      4   LOKESH, superimposed on CKD   This is most likely secondary to acute infection   Creatinine improved  Antibiotic dosages adjusted accordingly  Monitor creatinine      5   Noncompliant DM   Hemoglobin A1c on admission is 9 3   This clearly contributes to infection development   This will also contribute to poor healing potential   Management per Medicine Service    Discussed with patient in great detail regarding the need to control blood sugar to avoid future infections      6   Neuropathy   This, together with noncompliance, puts patient at very high risk for future limb loss from injuries      Discussed with patient in detail regarding the above plan  Discussed with slim service      Antibiotics:  Cefepime/Flagyl # 3     Subjective:  Patient states right foot still with minimal pain  Temperature stays down  No chills  He is tolerating antibiotics well  No nausea, vomiting or diarrhea  Objective:  Vitals:  HR:  [76-86] 80  Resp:  [18] 18  BP: (151-180)/() 151/77  SpO2:  [97 %-98 %] 98 %  Temp (24hrs), Av 8 °F (37 1 °C), Min:98 5 °F (36 9 °C), Max:99 4 °F (37 4 °C)  Current: Temperature: 98 5 °F (36 9 °C)    Physical Exam:     General: Awake, alert, cooperative, no distress  Lungs: Expansion symmetric, no rales, no wheezing, respirations unlabored  Heart[de-identified]  Regular rate and rhythm, S1 and S2 normal, no murmur  Abdomen: Soft, nondistended, non-tender, bowel sounds active all four quadrants,        no masses, no organomegaly  Extremities: Right foot with stable wound/ulcer  Stable mild erythema  No purulence  No fluctuance  No tenderness  Skin:  No rash  Invasive Devices     Peripheral Intravenous Line            Peripheral IV 17 Right Antecubital 3 days                Labs studies:   I have personally reviewed pertinent labs      Results from last 7 days  Lab Units 17  04417  1641 17  1629   SODIUM mmol/L 139  --  134*   POTASSIUM mmol/L 3 9  --  4 4   CHLORIDE mmol/L 106  --  99*   CO2 mmol/L 24  --  27   ANION GAP mmol/L 9  --  8   BUN mg/dL 13  --  17   CREATININE mg/dL 1 35*  --  1 73*   EGFR ml/min/1 73sq m 60 49 44   GLUCOSE RANDOM mg/dL 159*  --  354*   GLUCOSE, ISTAT mg/dl  --  341*  --    CALCIUM mg/dL 8 6  --  9 3       Results from last 7 days  Lab Units 17  0516 17  0448 17  2147 17  1641 17  1629   WBC Thousand/uL 10 45* 10 68*  --   --  11 51*   HEMOGLOBIN g/dL 10 8* 10 7*  --   --  11 4*   I STAT HEMOGLOBIN g/dl  --   --   --  12 9  --    PLATELETS Thousands/uL 354 340 347  --  368       Results from last 7 days  Lab Units 11/22/17  1429 11/21/17  2245 11/21/17  2238 11/21/17  2148 11/21/17  1633 11/21/17  1629   BLOOD CULTURE   --   --  No Growth at 48 hrs  No Growth at 48 hrs  No Growth at 48 hrs  No Growth at 48 hrs  GRAM STAIN RESULT  2+ Polys  2+ Gram positive cocci in pairs  2+ Gram negative rods  --   --   --   --   --    WOUND CULTURE  2+ Growth of Staphylococcus aureus*  2+ Growth of Beta Hemolytic Streptococcus Group B*  1+ Growth of   --   --   --   --   --    MRSA CULTURE ONLY   --  No Methicillin Resistant Staphlyococcus aureus (MRSA) isolated  --   --   --   --        Imaging Studies:   I have personally reviewed pertinent imaging study reports and images in PACS  EKG, Pathology, and Other Studies:   I have personally reviewed pertinent reports

## 2017-11-24 NOTE — PROGRESS NOTES
Progress Note - Gavin Tyson 46 y o  male MRN: [id number]    Unit/Bed#: -01 Encounter: [id number]        Foot injury   Assessment & Plan    · Puncture wound prior to admission, see treatment plan below        Cellulitis and abscess of toe of right foot   Assessment & Plan    · Secondary to right foot injury with puncture wound to the 3rd toe, tinea pedis, diabetes with neuropathy  Cellulitis including 2nd, 3rd, 4th and 5th toes  Does not meet SIRS requirement  Failed Keflex outpatient  · Podiatry and Infectious Disease following, agree with cefepime for pseudomonal coverage and Flagyl for environmental pathogen  Patient is status post debridement, await WBC scan today  · Blood culture NGTD x 48 hours, MRSA pending, wound culture prelim group B beta-hemolytic strep and Staph aureus  · Foot x-ray/MRI equivocal         Acute kidney injury ([facility] )   Assessment & Plan    · Present on admission, likely secondary to uncontrolled DM and underlying cellulitis  Improved from 1 73-1 35 with IV hydration  Will recheck BMP in a m  Diabetes mellitus ([facility] )   Assessment & Plan    · Uncontrolled, type 2 insulin-dependent diabetic with A1c 9 3% this admission  · Continue Lantus, consistent carb diet, sliding-scale insulin with meals; blood glucose on review better today, range is 130-60  · Will check urine microalbumin, patient would benefit from addition of ACE-inhibitor medication  · Lipid profile is reasonable: total 129, LDL 74, HDL 27, , however given the diabetes he is recommended to be on statin medication  Would discuss initiating low-dose Crestor for the patient at discharge  Obesity   Assessment & Plan    · BMI 34 31, patient with obesity in the setting of uncontrolled diabetes  Nutrition consult, patient is encouraged for weight loss and therapeutic lifestyle changes along with adherence to diabetes diet            Hypertension   Assessment & Plan    · Blood pressure reviewed, better this still running a little higher  Continue IV hydralazine as needed, metoprolol higher dose 50 mg b i d  will await repeat BMP tomorrow and consider addition of ACE-inhibitor given his diabetes              VTE Pharmacologic Prophylaxis:   Pharmacologic: Heparin  Mechanical: Mechanical VTE prophylaxis in place  Patient Centered Rounds: I have performed bedside rounds with nursing staff today  Discussions with Specialists or Other Care Team Provider:  Infectious Disease, case management  Education and Discussions with Family / Patient:  Discussed with the patient current treatment plan  Time Spent for Care: 15 minutes  More than 50% of total time spent on counseling and coordination of care as described above  Current Length of Stay: 3 day(s)  Current Patient Status: Inpatient   Certification Statement: The patient will continue to require additional inpatient hospital stay due to Await white blood cell scan for further planning amputation, continued IV antibiotics    Discharge Plan:  Not cleared for discharge  Code Status: Level 1 - Full Code    CC:  Nausea  Subjective:   Patient seen sleeping this morning, easily woken  He reports nausea around 5:00 a m  this morning with an episode of emesis, nonbloody  He reports the Zofran gave him minimal relief  He also felt some abdominal cramps and did have a bowel movement  Otherwise denies subjective fevers or chills, does not complain of increased pain at the foot  Objective:   Vitals:   Temp (24hrs), Av °F (37 2 °C), Min:98 5 °F (36 9 °C), Max:99 4 °F (37 4 °C)    HR:  [80-86] 81  Resp:  [18] 18  BP: (151-170)/(77-98) 168/98  SpO2:  [97 %-98 %] 97 %  Body mass index is 34 31 kg/m²  Input and Output Summary (last 24 hours):        Intake/Output Summary (Last 24 hours) at 17 1642  Last data filed at 17 0802   Gross per 24 hour   Intake          3154 17 ml   Output             2100 ml   Net          1054 17 ml       Physical Exam: Physical Exam   Constitutional: Vital signs are normal  He appears well-developed and well-nourished  No distress  Cardiovascular: Normal rate, regular rhythm, S1 normal, S2 normal and normal heart sounds  No murmur heard  Pulmonary/Chest: Effort normal and breath sounds normal  No respiratory distress  He has no wheezes  He has no rhonchi  He has no rales  Abdominal: Soft  Bowel sounds are normal  He exhibits no distension  There is no tenderness  There is no rigidity and no guarding  Musculoskeletal: He exhibits edema (Bilat, right > left)  Skin:   Right foot wrapped, did not remove dressing   Nursing note and vitals reviewed  Additional Data:   Labs:    Results from last 7 days  Lab Units 11/23/17  0516   WBC Thousand/uL 10 45*   HEMOGLOBIN g/dL 10 8*   HEMATOCRIT % 34 2*   PLATELETS Thousands/uL 354   NEUTROS PCT % 64   LYMPHS PCT % 25   MONOS PCT % 5   EOS PCT % 5       Results from last 7 days  Lab Units 11/22/17  0449   SODIUM mmol/L 139   POTASSIUM mmol/L 3 9   CHLORIDE mmol/L 106   CO2 mmol/L 24   BUN mg/dL 13   CREATININE mg/dL 1 35*   CALCIUM mg/dL 8 6   GLUCOSE RANDOM mg/dL 159*       Results from last 7 days  Lab Units 11/21/17  2147   INR  1 09       * I Have Reviewed All Lab Data Listed Above  * Additional Pertinent Lab Tests Reviewed:  All Labs Within Last 24 Hours Reviewed    Imaging:    Imaging Reports Reviewed Today Include:  None at this time    Cultures:   Blood Culture:   Lab Results   Component Value Date    BLOODCX No Growth at 48 hrs  11/21/2017    BLOODCX No Growth at 48 hrs  11/21/2017    BLOODCX No Growth at 48 hrs  11/21/2017    BLOODCX No Growth at 48 hrs  11/21/2017     Urine Culture: No results found for: URINECX  Sputum Culture: No components found for: SPUTUMCX  Wound Culture:   Lab Results   Component Value Date    WOUNDCULT 2+ Growth of Staphylococcus aureus (A) 11/22/2017    WOUNDCULT 2+ Growth of Beta Hemolytic Streptococcus Group B (A) 11/22/2017 WOUNDCULT 1+ Growth of  11/22/2017       Last 24 Hours Medication List:     cefepime 2,000 mg Intravenous Q12H   Empagliflozin 1 tablet Oral Daily   heparin (porcine) 5,000 Units Subcutaneous Q8H [address]   insulin glargine 24 Units Subcutaneous HS   insulin lispro 1-6 Units Subcutaneous TID AC   insulin lispro 1-6 Units Subcutaneous HS   insulin lispro 6 Units Subcutaneous Daily With Breakfast   metoprolol tartrate 50 mg Oral Once   metoprolol tartrate 50 mg Oral Q12H ELODIA   metroNIDAZOLE 500 mg Oral Q8H [address]   saccharomyces boulardii 250 mg Oral BID        Today, Patient Was Seen By: Mikaela Soto PA-C    ** Please Note: Dragon 360 Dictation voice to text software may have been used in the creation of this document   **

## 2017-11-24 NOTE — ASSESSMENT & PLAN NOTE
· Secondary to right foot injury with puncture wound to the 3rd toe, tinea pedis, diabetes with neuropathy  Cellulitis including 2nd, 3rd, 4th and 5th toes  Does not meet SIRS requirement  Failed Keflex outpatient  · Podiatry and Infectious Disease following, agree with cefepime for pseudomonal coverage and Flagyl for environmental pathogen  Patient is status post debridement, await WBC scan today    · Blood culture NGTD x 48 hours, MRSA pending, wound culture prelim group B beta-hemolytic strep and Staph aureus  · Foot x-ray/MRI equivocal

## 2017-11-24 NOTE — PLAN OF CARE
DISCHARGE PLANNING     Discharge to home or other facility with appropriate resources Progressing        INFECTION - ADULT     Absence or prevention of progression during hospitalization Progressing     Absence of fever/infection during neutropenic period Progressing        Knowledge Deficit     Patient/family/caregiver demonstrates understanding of disease process, treatment plan, medications, and discharge instructions Progressing        METABOLIC, FLUID AND ELECTROLYTES - ADULT     Glucose maintained within target range Progressing        Nutrition/Hydration-ADULT     Nutrient/Hydration intake appropriate for improving, restoring or maintaining nutritional needs Progressing        PAIN - ADULT     Verbalizes/displays adequate comfort level or baseline comfort level Progressing        Potential for Falls     Patient will remain free of falls Progressing        SAFETY ADULT     Patient will remain free of falls Progressing     Maintain or return to baseline ADL function Progressing     Maintain or return mobility status to optimal level Progressing        SKIN/TISSUE INTEGRITY - ADULT     Skin integrity remains intact Progressing     Incision(s), wounds(s) or drain site(s) healing without S/S of infection Progressing     Oral mucous membranes remain intact Progressing

## 2017-11-24 NOTE — ASSESSMENT & PLAN NOTE
· BMI 34 31, patient with obesity in the setting of uncontrolled diabetes  Nutrition consult, patient is encouraged for weight loss and therapeutic lifestyle changes along with adherence to diabetes diet

## 2017-11-24 NOTE — SUBJECTIVE & OBJECTIVE
VTE Pharmacologic Prophylaxis:   Pharmacologic: Heparin  Mechanical: Mechanical VTE prophylaxis in place  Patient Centered Rounds: I have performed bedside rounds with nursing staff today  Discussions with Specialists or Other Care Team Provider:  Infectious Disease, case management  Education and Discussions with Family / Patient:  Discussed with the patient current treatment plan  Time Spent for Care: 15 minutes  More than 50% of total time spent on counseling and coordination of care as described above  Current Length of Stay: 3 day(s)  Current Patient Status: Inpatient   Certification Statement: The patient will continue to require additional inpatient hospital stay due to Await white blood cell scan for further planning amputation, continued IV antibiotics    Discharge Plan:  Not cleared for discharge  Code Status: Level 1 - Full Code    CC:  Nausea  Subjective:   Patient seen sleeping this morning, easily woken  He reports nausea around 5:00 a m  this morning with an episode of emesis, nonbloody  He reports the Zofran gave him minimal relief  He also felt some abdominal cramps and did have a bowel movement  Otherwise denies subjective fevers or chills, does not complain of increased pain at the foot  Objective:   Vitals:   Temp (24hrs), Av °F (37 2 °C), Min:98 5 °F (36 9 °C), Max:99 4 °F (37 4 °C)    HR:  [80-86] 81  Resp:  [18] 18  BP: (151-170)/(77-98) 168/98  SpO2:  [97 %-98 %] 97 %  Body mass index is 34 31 kg/m²  Input and Output Summary (last 24 hours): Intake/Output Summary (Last 24 hours) at 17 1642  Last data filed at 17 0802   Gross per 24 hour   Intake          3154 17 ml   Output             2100 ml   Net          1054 17 ml       Physical Exam:     Physical Exam   Constitutional: Vital signs are normal  He appears well-developed and well-nourished  No distress  Cardiovascular: Normal rate, regular rhythm, S1 normal, S2 normal and normal heart sounds  No murmur heard  Pulmonary/Chest: Effort normal and breath sounds normal  No respiratory distress  He has no wheezes  He has no rhonchi  He has no rales  Abdominal: Soft  Bowel sounds are normal  He exhibits no distension  There is no tenderness  There is no rigidity and no guarding  Musculoskeletal: He exhibits edema (Bilat, right > left)  Skin:   Right foot wrapped, did not remove dressing   Nursing note and vitals reviewed  Additional Data:   Labs:    Results from last 7 days  Lab Units 11/23/17  0516   WBC Thousand/uL 10 45*   HEMOGLOBIN g/dL 10 8*   HEMATOCRIT % 34 2*   PLATELETS Thousands/uL 354   NEUTROS PCT % 64   LYMPHS PCT % 25   MONOS PCT % 5   EOS PCT % 5       Results from last 7 days  Lab Units 11/22/17  0449   SODIUM mmol/L 139   POTASSIUM mmol/L 3 9   CHLORIDE mmol/L 106   CO2 mmol/L 24   BUN mg/dL 13   CREATININE mg/dL 1 35*   CALCIUM mg/dL 8 6   GLUCOSE RANDOM mg/dL 159*       Results from last 7 days  Lab Units 11/21/17  2147   INR  1 09       * I Have Reviewed All Lab Data Listed Above  * Additional Pertinent Lab Tests Reviewed:  All Labs Within Last 24 Hours Reviewed    Imaging:    Imaging Reports Reviewed Today Include:  None at this time    Cultures:   Blood Culture:   Lab Results   Component Value Date    BLOODCX No Growth at 48 hrs  11/21/2017    BLOODCX No Growth at 48 hrs  11/21/2017    BLOODCX No Growth at 48 hrs  11/21/2017    BLOODCX No Growth at 48 hrs  11/21/2017     Urine Culture: No results found for: URINECX  Sputum Culture: No components found for: SPUTUMCX  Wound Culture:   Lab Results   Component Value Date    WOUNDCULT 2+ Growth of Staphylococcus aureus (A) 11/22/2017    WOUNDCULT 2+ Growth of Beta Hemolytic Streptococcus Group B (A) 11/22/2017    WOUNDCULT 1+ Growth of  11/22/2017       Last 24 Hours Medication List:     cefepime 2,000 mg Intravenous Q12H   Empagliflozin 1 tablet Oral Daily   heparin (porcine) 5,000 Units Subcutaneous Q8H [address]   insulin glargine 24 Units Subcutaneous HS   insulin lispro 1-6 Units Subcutaneous TID AC   insulin lispro 1-6 Units Subcutaneous HS   insulin lispro 6 Units Subcutaneous Daily With Breakfast   metoprolol tartrate 50 mg Oral Once   metoprolol tartrate 50 mg Oral Q12H ELODIA   metroNIDAZOLE 500 mg Oral Q8H [address]   saccharomyces boulardii 250 mg Oral BID        Today, Patient Was Seen By: Chele Rangel PA-C    ** Please Note: Dragon 360 Dictation voice to text software may have been used in the creation of this document   **

## 2017-11-24 NOTE — ASSESSMENT & PLAN NOTE
· Uncontrolled, type 2 insulin-dependent diabetic with A1c 9 3% this admission  · Continue Lantus, consistent carb diet, sliding-scale insulin with meals; blood glucose on review better today, range is 130-60  · Will check urine microalbumin, patient would benefit from addition of ACE-inhibitor medication  · Lipid profile is reasonable: total 129, LDL 74, HDL 27, , however given the diabetes he is recommended to be on statin medication  Would discuss initiating low-dose Crestor for the patient at discharge

## 2017-11-24 NOTE — PROGRESS NOTES
Spoke with Dr Mariam Zapata regarding dressing on R foot and bone scan  Per Dr Mariam Zapata it was ok to apply a dry clean dressing

## 2017-11-24 NOTE — CASE MANAGEMENT
[address] [facility] in the [facility] by Fran Garcia for 2017  Network Utilization Review Department  Phone: [phone]; Fax [phone]  ATTENTION: The Network Utilization Review Department is now centralized for our 7 Facilities  Make a note that we have a new phone and fax numbers for our Department  Please call with any questions or concerns to [phone] and carefully follow the prompts so that you are directed to the right person  All voicemails are confidential  Fax any determinations, approvals, denials, and requests for initial or continue stay review clinical to [phone]  Due to HIGH CALL volume, it would be easier if you could please send faxed requests to expedite your requests and in part, help us provide discharge notifications faster    Continued Stay Review    Date: 11/24/17    Vital Signs: /77   Pulse 80   Temp 98 5 °F (36 9 °C) (Oral)   Resp 18   Ht 5' 9" (1 753 m)   Wt 105 kg (232 lb 5 8 oz)   SpO2 98%   BMI 34 31 kg/m²     Medications:   Scheduled Meds:   cefepime 2,000 mg Intravenous Q12H   Empagliflozin 1 tablet Oral Daily   heparin (porcine) 5,000 Units Subcutaneous Q8H [address]   insulin glargine 24 Units Subcutaneous HS   insulin lispro 1-6 Units Subcutaneous TID AC   insulin lispro 1-6 Units Subcutaneous HS   insulin lispro 6 Units Subcutaneous Daily With Breakfast   metoprolol tartrate 50 mg Oral Once   metoprolol tartrate 50 mg Oral Q12H ELODIA   metroNIDAZOLE 500 mg Oral Q8H [address]   saccharomyces boulardii 250 mg Oral BID     Continuous Infusions:   sodium chloride 125 mL/hr Last Rate: 125 mL/hr (11/24/17 0802)     PRN Meds: hydrALAZINE    metoclopramide    ondansetron    Abnormal Labs/Diagnostic Results:   Lab Units 11/23/17  0516   WBC Thousand/uL 10 45*   HEMOGLOBIN g/dL 10 8*   HEMATOCRIT % 34 2*   PLATELETS Thousands/uL 354   NEUTROS PCT % 64   LYMPHS PCT % 25   MONOS PCT % 5   EOS PCT % 5         Results from last 7 days  Lab Units 11/22/17  0449   SODIUM mmol/L 139   POTASSIUM mmol/L 3 9   CHLORIDE mmol/L 106   CO2 mmol/L 24   BUN mg/dL 13   CREATININE mg/dL 1 35*   CALCIUM mg/dL 8 6   GLUCOSE RANDOM mg/dL 159*         Results from last 7 days  Lab Units 11/21/17  2147   INR   1 09         * I Have Reviewed All Lab Data Listed Above  * Additional Pertinent Lab Tests Reviewed: All Labs Within Last 24 Hours Reviewed     Imaging:     Imaging Reports Reviewed Today Include: XR, MRI reports reviewed      Cultures:   Blood Culture:         Lab Results   Component Value Date     BLOODCX No Growth at 24 hrs  11/21/2017     BLOODCX No Growth at 24 hrs  11/21/2017     BLOODCX No Growth at 24 hrs  11/21/2017     BLOODCX No Growth at 24 hrs  11/21/2017      Urine Culture: No results found for: URINECX  Sputum Culture: No components found for: SPUTUMCX  Wound Culture: No results found for: WOUNDCULT    Age/Sex: 46 y o  male     Assessment/Plan:    * Foot injury   Assessment & Plan     · Puncture wound prior to admission, see treatment plan below       Cellulitis and abscess of toe of right foot   Assessment & Plan     · Secondary to right foot injury with puncture wound to the 3rd toe, tinea pedis, diabetes with neuropathy  Cellulitis including 2nd, 3rd, 4th and 5th toes  Does not meet SIRS requirement  Failed Keflex outpatient  · Podiatry and Infectious Disease following, agree with cefepime for pseudomonal coverage and Flagyl for environmental pathogen  Patient is status post debridement, await NM bone scan which will likely occur tomorrow  · Blood culture NGTD x 24 hours, MRSA pending, wound culture pending  · Foot x-ray/MRI equivocal        Acute kidney injury ([address] )   Assessment & Plan     · Present on admission, likely secondary to uncontrolled DM and underlying cellulitis  Improved from 1 73-1 35 with IV hydration    Will recheck BMP in a m        Diabetes mellitus ([address] )   Assessment & Plan     · Uncontrolled, type 2 insulin-dependent diabetic with A1c 9 3% this admission  · Continue Lantus, consistent carb diet, sliding-scale insulin with meals; blood glucose on review better today, range is 130-60  · Will check urine microalbumin, patient would benefit from addition of ACE-inhibitor medication  · Lipid profile is reasonable: total 129, LDL 74, HDL 27, , however given the diabetes he is recommended to be on statin medication  Will discuss initiating low-dose Crestor for the patient        Obesity   Assessment & Plan     · BMI 34 31, patient with obesity in the setting of uncontrolled diabetes  Will get nutrition consult, patient is encouraged for weight loss and therapeutic lifestyle changes along with adherence to diabetes diet          Hypertension   Assessment & Plan     · Blood pressure reviewed, uncontrolled at this time  Continue metoprolol, will increase to 50 mg as heart rate will tolerate increase in beta-blocker             VTE Pharmacologic Prophylaxis:   Pharmacologic: Heparin  Mechanical: Mechanical VTE prophylaxis in place      Patient Centered Rounds: I have performed bedside rounds with nursing staff today  Discussions with Specialists or Other Care Team Provider: podiatry and infectious disease notes reviewed   Education and Discussions with Family / Patient: d/w patient regarding IV antibiotics and possible need for amputation, he is very hesitant to accept amputation   Time Spent for Care: Approximately 25 minutes    More than 50% of total time spent on counseling and coordination of care as described above      Current Length of Stay: 2 day(s)  Current Patient Status: Inpatient   Certification Statement: The patient will continue to require additional inpatient hospital stay due to IV antibiotic treatment for cellulitis/puncture wound with concern for osteomyelitis; await NM bone scan     Discharge Plan:  Not medically cleared for discharge  Code Status: Level 1 - Full Code

## 2017-11-24 NOTE — PROGRESS NOTES
Progress Note - Infectious Disease   Murlean Nyhan 46 y o  male MRN: [id number]  Unit/Bed#: -01 Encounter: [id number]      Impression/Recommendations:  1  Right foot cellulitis, secondary to dirty nail puncture wound  In this kind of injury, pathogens involved include skin pathogens, environmental pathogens on the nail, and Pseudomonas residing in the sole of shoes  His antibiotic regimen will need to be broadened to cover these pathogens  Patient is clinically improved and remains systemically well  Blood cultures are negative so far  Continue high-dose IV cefepime and p o  Flagyl  Follow up on wound culture  Follow-up on blood cultures  Serial foot exam      2   Probable osteomyelitis of right 3rd toe  MRI findings are equivocal   However, with the dirty nail puncture injury, there is high clinical probability of osteomyelitis  Ceretec scan in progress  If osteomyelitis is present, probability of cure with antibiotic alone is extremely low in this noncompliant DM patient  In this case, I would recommend bone resection/amputation  Follow-up on Ceretec scan  Recommend bone resection (toe amputation versus ray resection versus TMA) if osteomyelitis is present      3   Dirty nail puncture injury  Pathogen considerations above  Patient is also at high risk for tetanus  He did receive TD booster last week      4  LOKESH, superimposed on CKD  This is most likely secondary to acute infection  Creatinine is improving  Antibiotic dosages adjusted accordingly  Monitor creatinine      5  Noncompliant DM  Hemoglobin A1c on admission is 9 3  This clearly contributes to infection development  This will also contribute to poor healing potential   Management per Medicine Service  Discussed with patient in great detail regarding the need to control blood sugar to avoid future infections      6   Neuropathy    This, together with noncompliance, puts patient at very high risk for future limb loss from injuries      Discussed with patient in detail regarding the above plan  Antibiotics:  Cefepime/Flagyl # 2    Subjective:  Patient states right foot with minimal pain  Temperature stays down  No chills  He is tolerating antibiotics well  No nausea, vomiting or diarrhea  Objective:  Vitals:  HR:  [76-85] 76  Resp:  [18] 18  BP: (176-188)/() 180/101  SpO2:  [95 %-98 %] 98 %  Temp (24hrs), Av 8 °F (37 1 °C), Min:98 6 °F (37 °C), Max:98 9 °F (37 2 °C)  Current: Temperature: 98 6 °F (37 °C)    Physical Exam:     General: Awake, alert, cooperative, no distress  Lungs: Expansion symmetric, no rales, no wheezing, respirations unlabored  Heart[de-identified]  Regular rate and rhythm, S1 and S2 normal, no murmur  Abdomen: Soft, nondistended, non-tender, bowel sounds active all four quadrants,        no masses, no organomegaly  Extremities: Right foot with stable ulcer  Stable erythema  No purulence  No fluctuance  Minimal tenderness  Skin:  No rash  Invasive Devices     Peripheral Intravenous Line            Peripheral IV 17 Right Antecubital 2 days                Labs studies:   I have personally reviewed pertinent labs      Results from last 7 days  Lab Units 17  04417  16417  1629   SODIUM mmol/L 139  --  134*   POTASSIUM mmol/L 3 9  --  4 4   CHLORIDE mmol/L 106  --  99*   CO2 mmol/L 24  --  27   ANION GAP mmol/L 9  --  8   BUN mg/dL 13  --  17   CREATININE mg/dL 1 35*  --  1 73*   EGFR ml/min/1 73sq m 60 49 44   GLUCOSE RANDOM mg/dL 159*  --  354*   GLUCOSE, ISTAT mg/dl  --  341*  --    CALCIUM mg/dL 8 6  --  9 3       Results from last 7 days  Lab Units 17  0516 17  0448 17  2147 17  1641 17  1629   WBC Thousand/uL 10 45* 10 68*  --   --  11 51*   HEMOGLOBIN g/dL 10 8* 10 7*  --   --  11 4*   I STAT HEMOGLOBIN g/dl  --   --   --  12 9  --    PLATELETS Thousands/uL 354 340 347  --  368       Results from last 7 days  Lab Units 11/22/17  1429 11/21/17  2245 11/21/17  2238 11/21/17  2148 11/21/17  1633 11/21/17  1629   BLOOD CULTURE   --   --  No Growth at 24 hrs  No Growth at 24 hrs  No Growth at 48 hrs  No Growth at 48 hrs  GRAM STAIN RESULT  2+ Polys  2+ Gram positive cocci in pairs  2+ Gram negative rods  --   --   --   --   --    WOUND CULTURE  Culture too young- will reincubate  --   --   --   --   --    MRSA CULTURE ONLY   --  No Methicillin Resistant Staphlyococcus aureus (MRSA) isolated  --   --   --   --        Imaging Studies:   I have personally reviewed pertinent imaging study reports and images in PACS  EKG, Pathology, and Other Studies:   I have personally reviewed pertinent reports

## 2017-11-24 NOTE — ASSESSMENT & PLAN NOTE
· Blood pressure reviewed, better this still running a little higher    Continue IV hydralazine as needed, metoprolol higher dose 50 mg b i d  will await repeat BMP tomorrow and consider addition of ACE-inhibitor given his diabetes

## 2017-11-25 ENCOUNTER — APPOINTMENT (INPATIENT)
Dept: NUCLEAR MEDICINE | Facility: HOSPITAL | Age: 52
DRG: 638 | End: 2017-11-25
Payer: COMMERCIAL

## 2017-11-25 LAB
ANION GAP SERPL CALCULATED.3IONS-SCNC: 7 MMOL/L (ref 4–13)
BACTERIA WND AEROBE CULT: ABNORMAL
BASOPHILS # BLD AUTO: 0.08 THOUSANDS/ΜL (ref 0–0.1)
BASOPHILS NFR BLD AUTO: 1 % (ref 0–1)
BUN SERPL-MCNC: 18 MG/DL (ref 5–25)
CALCIUM SERPL-MCNC: 9 MG/DL (ref 8.3–10.1)
CHLORIDE SERPL-SCNC: 105 MMOL/L (ref 100–108)
CO2 SERPL-SCNC: 24 MMOL/L (ref 21–32)
CREAT SERPL-MCNC: 1.2 MG/DL (ref 0.6–1.3)
EOSINOPHIL # BLD AUTO: 0.48 THOUSAND/ΜL (ref 0–0.61)
EOSINOPHIL NFR BLD AUTO: 5 % (ref 0–6)
ERYTHROCYTE [DISTWIDTH] IN BLOOD BY AUTOMATED COUNT: 13.4 % (ref 11.6–15.1)
GFR SERPL CREATININE-BSD FRML MDRD: 69 ML/MIN/1.73SQ M
GLUCOSE SERPL-MCNC: 161 MG/DL (ref 65–140)
GLUCOSE SERPL-MCNC: 172 MG/DL (ref 65–140)
GLUCOSE SERPL-MCNC: 184 MG/DL (ref 65–140)
GLUCOSE SERPL-MCNC: 215 MG/DL (ref 65–140)
GLUCOSE SERPL-MCNC: 276 MG/DL (ref 65–140)
GRAM STN SPEC: ABNORMAL
HCT VFR BLD AUTO: 35.9 % (ref 36.5–49.3)
HGB BLD-MCNC: 11.4 G/DL (ref 12–17)
LYMPHOCYTES # BLD AUTO: 1.72 THOUSANDS/ΜL (ref 0.6–4.47)
LYMPHOCYTES NFR BLD AUTO: 17 % (ref 14–44)
MCH RBC QN AUTO: 27.3 PG (ref 26.8–34.3)
MCHC RBC AUTO-ENTMCNC: 31.8 G/DL (ref 31.4–37.4)
MCV RBC AUTO: 86 FL (ref 82–98)
MONOCYTES # BLD AUTO: 0.48 THOUSAND/ΜL (ref 0.17–1.22)
MONOCYTES NFR BLD AUTO: 5 % (ref 4–12)
NEUTROPHILS # BLD AUTO: 7.62 THOUSANDS/ΜL (ref 1.85–7.62)
NEUTS SEG NFR BLD AUTO: 73 % (ref 43–75)
NRBC BLD AUTO-RTO: 0 /100 WBCS
PLATELET # BLD AUTO: 374 THOUSANDS/UL (ref 149–390)
PMV BLD AUTO: 9.3 FL (ref 8.9–12.7)
POTASSIUM SERPL-SCNC: 4.2 MMOL/L (ref 3.5–5.3)
RBC # BLD AUTO: 4.17 MILLION/UL (ref 3.88–5.62)
SODIUM SERPL-SCNC: 136 MMOL/L (ref 136–145)
WBC # BLD AUTO: 10.42 THOUSAND/UL (ref 4.31–10.16)

## 2017-11-25 PROCEDURE — A9569 TECHNETIUM TC-99M AUTO WBC: HCPCS

## 2017-11-25 PROCEDURE — 82948 REAGENT STRIP/BLOOD GLUCOSE: CPT

## 2017-11-25 PROCEDURE — 78805 HB ABSCESS IMAGING LTD AREA: CPT

## 2017-11-25 PROCEDURE — 80048 BASIC METABOLIC PNL TOTAL CA: CPT | Performed by: PHYSICIAN ASSISTANT

## 2017-11-25 PROCEDURE — 85025 COMPLETE CBC W/AUTO DIFF WBC: CPT | Performed by: PHYSICIAN ASSISTANT

## 2017-11-25 RX ADMIN — SODIUM CHLORIDE 125 ML/HR: 0.9 INJECTION, SOLUTION INTRAVENOUS at 15:08

## 2017-11-25 RX ADMIN — METRONIDAZOLE 500 MG: 500 TABLET ORAL at 13:12

## 2017-11-25 RX ADMIN — METOPROLOL TARTRATE 50 MG: 50 TABLET, FILM COATED ORAL at 10:25

## 2017-11-25 RX ADMIN — INSULIN LISPRO 1 UNITS: 100 INJECTION, SOLUTION INTRAVENOUS; SUBCUTANEOUS at 07:32

## 2017-11-25 RX ADMIN — ONDANSETRON 4 MG: 2 INJECTION INTRAMUSCULAR; INTRAVENOUS at 02:45

## 2017-11-25 RX ADMIN — CEFEPIME HYDROCHLORIDE 2000 MG: 2 INJECTION, SOLUTION INTRAVENOUS at 15:14

## 2017-11-25 RX ADMIN — HEPARIN SODIUM 5000 UNITS: 5000 INJECTION, SOLUTION INTRAVENOUS; SUBCUTANEOUS at 13:12

## 2017-11-25 RX ADMIN — INSULIN LISPRO 6 UNITS: 100 INJECTION, SOLUTION INTRAVENOUS; SUBCUTANEOUS at 10:22

## 2017-11-25 RX ADMIN — INSULIN LISPRO 1 UNITS: 100 INJECTION, SOLUTION INTRAVENOUS; SUBCUTANEOUS at 17:23

## 2017-11-25 RX ADMIN — INSULIN LISPRO 1 UNITS: 100 INJECTION, SOLUTION INTRAVENOUS; SUBCUTANEOUS at 13:14

## 2017-11-25 RX ADMIN — HEPARIN SODIUM 5000 UNITS: 5000 INJECTION, SOLUTION INTRAVENOUS; SUBCUTANEOUS at 22:05

## 2017-11-25 RX ADMIN — METRONIDAZOLE 500 MG: 500 TABLET ORAL at 06:51

## 2017-11-25 RX ADMIN — INSULIN GLARGINE 24 UNITS: 100 INJECTION, SOLUTION SUBCUTANEOUS at 22:04

## 2017-11-25 RX ADMIN — Medication 250 MG: at 10:30

## 2017-11-25 RX ADMIN — CEFEPIME HYDROCHLORIDE 2000 MG: 2 INJECTION, SOLUTION INTRAVENOUS at 03:28

## 2017-11-25 RX ADMIN — HYDRALAZINE HYDROCHLORIDE 5 MG: 20 INJECTION INTRAMUSCULAR; INTRAVENOUS at 00:31

## 2017-11-25 RX ADMIN — INSULIN LISPRO 4 UNITS: 100 INJECTION, SOLUTION INTRAVENOUS; SUBCUTANEOUS at 22:05

## 2017-11-25 RX ADMIN — SODIUM CHLORIDE 125 ML/HR: 0.9 INJECTION, SOLUTION INTRAVENOUS at 02:16

## 2017-11-25 RX ADMIN — HEPARIN SODIUM 5000 UNITS: 5000 INJECTION, SOLUTION INTRAVENOUS; SUBCUTANEOUS at 06:52

## 2017-11-25 RX ADMIN — METOPROLOL TARTRATE 50 MG: 50 TABLET, FILM COATED ORAL at 22:04

## 2017-11-25 RX ADMIN — Medication 250 MG: at 18:03

## 2017-11-25 RX ADMIN — METRONIDAZOLE 500 MG: 500 TABLET ORAL at 22:05

## 2017-11-25 NOTE — PROGRESS NOTES
Patient is out of room for final ceretec scan read  Await final read by Radiology, for full surgical planning TMA vs Ray Resection vs Debridement

## 2017-11-25 NOTE — PROGRESS NOTES
[facility] Progress Note  Patient: Divina Bueno 46 y o  male   MRN: [id number]  PCP: Lopez Gilmore MD  Unit/Bed#: MS [facility] Encounter: [id number]  Date Of Visit: 17    Assessment/Plan:          · Right foot osteomyelitis with history of injury to the right foot  Patient is in the process of completing WBC tagged scan  Further workup/treatment including surgical option as per Podiatry  Continue with IV antibiotics  · Diabetes mellitus type 2 uncontrolled  He is not compliant with his diet or medical treatment/close monitoring  Continue with current treatment including sliding scale  · Obesity  He needs to work on his diet and exercise  · Acute kidney injury-resolving/stable  · Dyslipidemia  HDL is only 27 and his LDL is also 74  VTE Pharmacologic Prophylaxis:   Pharmacologic: Heparin  Mechanical VTE Prophylaxis in Place: Yes-left leg only    Patient Centered Rounds: I have performed bedside rounds with nursing staff today  Discussions with Specialists or Other Care Team Provider:  Yes    Education and Discussions with Family / Patient:  Yes        Current Length of Stay: 4 day(s)    Current Patient Status: Inpatient   Certification Statement: The patient will continue to require additional inpatient hospital stay due to IV antibiotics/possible right foot surgery    Discharge Plan:  Home when stable    Code Status: Level 1 - Full Code      Subjective:   Feeling somewhat nauseous  He thinks that he got the dye/radiation for his foot test and that is making him nauseous  Denies any chest pain  Denies any fever or chills  Denies any abdominal pain  Objective:     Vitals:   Temp (24hrs), Av 6 °F (37 °C), Min:97 9 °F (36 6 °C), Max:99 1 °F (37 3 °C)    HR:  [77-83] 77  Resp:  [18-20] 18  BP: (148-180)/(73-98) 174/92  SpO2:  [95 %-97 %] 95 %  Body mass index is 34 31 kg/m²  Input and Output Summary (last 24 hours):        Intake/Output Summary (Last 24 hours) at 11/25/17 1151  Last data filed at 11/25/17 0216   Gross per 24 hour   Intake          2519 17 ml   Output             1300 ml   Net          1219 17 ml           Physical Exam:      Vital signs are reviewed as above  Constitutional:  Patient up in the chair   Eyes: EOM grossly intact  HENT: Oropharynx are moist    Cardiac: I did not hear any rubs or gallop  Patient appears to be in sinus rhythm  Respiratory: Patient not in significant respiratory distress  Air entry in general is fair  GI: Abdomen is obese and soft  It is grossly nontender  Bowel sounds are adequate  I was not able to appreciate any hepatosplenomegaly  Neurologic:  Patient is awake and alert  Neurological examination is grossly intact  No obvious focal neurological deficit noticed  Skin:  Has dressing on his right foot   Psychiatric: Mood and affect are pleasant  Musculoskeletal   Moving his upper arms and legs  Has dressing on his right foot  Extremities: Patient has swelling of his right foot       Additional Data:     Labs:      Results from last 7 days  Lab Units 11/25/17  0542   WBC Thousand/uL 10 42*   HEMOGLOBIN g/dL 11 4*   HEMATOCRIT % 35 9*   PLATELETS Thousands/uL 374   NEUTROS PCT % 73   LYMPHS PCT % 17   MONOS PCT % 5   EOS PCT % 5       Results from last 7 days  Lab Units 11/25/17  0542   SODIUM mmol/L 136   POTASSIUM mmol/L 4 2   CHLORIDE mmol/L 105   CO2 mmol/L 24   BUN mg/dL 18   CREATININE mg/dL 1 20   CALCIUM mg/dL 9 0   GLUCOSE RANDOM mg/dL 172*       Results from last 7 days  Lab Units 11/21/17  2147   INR  1 09       * I Have Reviewed All Lab Data Listed Above  * Additional Pertinent Lab Tests Reviewed: All Labs Within Last 24 Hours Reviewed    Imaging:    Imaging Reports Reviewed Today Include:  WBC tagged scan reviewed results    Possible osteomyelitis      Recent Cultures (last 7 days):       Results from last 7 days  Lab Units 11/22/17  1429 11/21/17  2238 11/21/17  2148 11/21/17  1633 11/21/17  1629   BLOOD CULTURE   --  No Growth at 72 hrs  No Growth at 72 hrs  No Growth at 72 hrs  No Growth at 72 hrs  GRAM STAIN RESULT  2+ Polys  2+ Gram positive cocci in pairs  2+ Gram negative rods  --   --   --   --    WOUND CULTURE  2+ Growth of Staphylococcus aureus*  2+ Growth of Beta Hemolytic Streptococcus Group B*  1+ Growth of   --   --   --   --        Last 24 Hours Medication List:     cefepime 2,000 mg Intravenous Q12H   Empagliflozin 1 tablet Oral Daily   heparin (porcine) 5,000 Units Subcutaneous Q8H [address]   insulin glargine 24 Units Subcutaneous HS   insulin lispro 1-6 Units Subcutaneous TID AC   insulin lispro 1-6 Units Subcutaneous HS   insulin lispro 6 Units Subcutaneous Daily With Breakfast   metoprolol tartrate 50 mg Oral Once   metoprolol tartrate 50 mg Oral Q12H ELODIA   metroNIDAZOLE 500 mg Oral Q8H [address]   saccharomyces boulardii 250 mg Oral BID        Today, Patient Was Seen By: Solo Spears MD    ** Please Note: Dragon 360 Dictation voice to text software may have been used in the creation of this document   **

## 2017-11-26 LAB
ATRIAL RATE: 78 BPM
ATRIAL RATE: 78 BPM
BACTERIA BLD CULT: NORMAL
BACTERIA BLD CULT: NORMAL
GLUCOSE SERPL-MCNC: 143 MG/DL (ref 65–140)
GLUCOSE SERPL-MCNC: 172 MG/DL (ref 65–140)
GLUCOSE SERPL-MCNC: 219 MG/DL (ref 65–140)
GLUCOSE SERPL-MCNC: 224 MG/DL (ref 65–140)
P AXIS: 40 DEGREES
P AXIS: 43 DEGREES
PR INTERVAL: 162 MS
PR INTERVAL: 170 MS
QRS AXIS: 54 DEGREES
QRS AXIS: 60 DEGREES
QRSD INTERVAL: 86 MS
QRSD INTERVAL: 88 MS
QT INTERVAL: 386 MS
QT INTERVAL: 390 MS
QTC INTERVAL: 440 MS
QTC INTERVAL: 444 MS
T WAVE AXIS: 68 DEGREES
T WAVE AXIS: 83 DEGREES
VENTRICULAR RATE: 78 BPM
VENTRICULAR RATE: 78 BPM

## 2017-11-26 PROCEDURE — 82948 REAGENT STRIP/BLOOD GLUCOSE: CPT

## 2017-11-26 PROCEDURE — 93005 ELECTROCARDIOGRAM TRACING: CPT

## 2017-11-26 PROCEDURE — 93005 ELECTROCARDIOGRAM TRACING: CPT | Performed by: INTERNAL MEDICINE

## 2017-11-26 RX ORDER — INSULIN GLARGINE 100 [IU]/ML
30 INJECTION, SOLUTION SUBCUTANEOUS
Status: DISCONTINUED | OUTPATIENT
Start: 2017-11-26 | End: 2017-11-27

## 2017-11-26 RX ADMIN — CEFAZOLIN SODIUM 2000 MG: 2 SOLUTION INTRAVENOUS at 17:01

## 2017-11-26 RX ADMIN — METRONIDAZOLE 500 MG: 500 TABLET ORAL at 14:54

## 2017-11-26 RX ADMIN — Medication 250 MG: at 18:27

## 2017-11-26 RX ADMIN — INSULIN LISPRO 2 UNITS: 100 INJECTION, SOLUTION INTRAVENOUS; SUBCUTANEOUS at 12:37

## 2017-11-26 RX ADMIN — HYDRALAZINE HYDROCHLORIDE 5 MG: 20 INJECTION INTRAMUSCULAR; INTRAVENOUS at 23:18

## 2017-11-26 RX ADMIN — INSULIN LISPRO 6 UNITS: 100 INJECTION, SOLUTION INTRAVENOUS; SUBCUTANEOUS at 07:56

## 2017-11-26 RX ADMIN — CEFEPIME HYDROCHLORIDE 2000 MG: 2 INJECTION, SOLUTION INTRAVENOUS at 03:26

## 2017-11-26 RX ADMIN — METOPROLOL TARTRATE 50 MG: 50 TABLET, FILM COATED ORAL at 08:12

## 2017-11-26 RX ADMIN — Medication 250 MG: at 08:12

## 2017-11-26 RX ADMIN — METRONIDAZOLE 500 MG: 500 TABLET ORAL at 21:47

## 2017-11-26 RX ADMIN — SODIUM CHLORIDE 125 ML/HR: 0.9 INJECTION, SOLUTION INTRAVENOUS at 00:09

## 2017-11-26 RX ADMIN — METOPROLOL TARTRATE 50 MG: 50 TABLET, FILM COATED ORAL at 21:47

## 2017-11-26 RX ADMIN — HEPARIN SODIUM 5000 UNITS: 5000 INJECTION, SOLUTION INTRAVENOUS; SUBCUTANEOUS at 21:47

## 2017-11-26 RX ADMIN — METRONIDAZOLE 500 MG: 500 TABLET ORAL at 06:26

## 2017-11-26 RX ADMIN — CEFAZOLIN SODIUM 2000 MG: 2 SOLUTION INTRAVENOUS at 09:30

## 2017-11-26 RX ADMIN — INSULIN GLARGINE 30 UNITS: 100 INJECTION, SOLUTION SUBCUTANEOUS at 21:48

## 2017-11-26 RX ADMIN — INSULIN LISPRO 2 UNITS: 100 INJECTION, SOLUTION INTRAVENOUS; SUBCUTANEOUS at 21:48

## 2017-11-26 RX ADMIN — ONDANSETRON 4 MG: 2 INJECTION INTRAMUSCULAR; INTRAVENOUS at 06:26

## 2017-11-26 RX ADMIN — INSULIN LISPRO 1 UNITS: 100 INJECTION, SOLUTION INTRAVENOUS; SUBCUTANEOUS at 06:26

## 2017-11-26 RX ADMIN — HEPARIN SODIUM 5000 UNITS: 5000 INJECTION, SOLUTION INTRAVENOUS; SUBCUTANEOUS at 06:26

## 2017-11-26 NOTE — PLAN OF CARE
Problem: DISCHARGE PLANNING - CARE MANAGEMENT  Goal: Discharge to post-acute care or home with appropriate resources  INTERVENTIONS:  - Conduct assessment to determine patient/family and health care team treatment goals, and need for post-acute services based on payer coverage, community resources, and patient preferences, and barriers to discharge  - Address psychosocial, clinical, and financial barriers to discharge as identified in assessment in conjunction with the patient/family and health care team  - Arrange appropriate level of post-acute services according to patients   needs and preference and payer coverage in collaboration with the physician and health care team  - Communicate with and update the patient/family, physician, and health care team regarding progress on the discharge plan  - Arrange appropriate transportation to post-acute venues  Outcome: Progressing  Cm met w pt who resides in [address] [place] [address] his wife, Rosita Delaney, [phone] and daughter Sujatha Ruano, [phone]  Pt has no POA/AD  2 story home  Flight to second floor  Pt ind w adls  Drives  Has multiple canes, but does not use them  Uses [facility] [address] for RXs  Pt agreeable to home RN for cellulitis  No preference  [facility] not in network w [facility]  Referral sent to [facility]  No other reported needs at this time

## 2017-11-26 NOTE — PROGRESS NOTES
Progress Note - Murlean Nyhan 46 y o  male MRN: [id number]    Unit/Bed#: -01 Encounter: [id number]      Assessment:  1) Osteomyelitis right foot  2) Nonhealing ulcer right foot      Plan:  1) dressed with betadine and DSD  - Patient is no longer agreeable to any sugery, he states that he wants to try antibiotic therapy and then if that does not work he will pursue surgery  - Discussed with patient that the likelihood of antibiotics being successful is extremely low with nonhealing ulcer and infection, patient is at high risk for limb loss without debridement     Subjective:   Patient seen bedside resting comfortably  Feels "much better"  Objective:     Vitals: Blood pressure (!) 183/90, pulse 80, temperature 98 5 °F (36 9 °C), temperature source Oral, resp  rate 18, height 5' 9" (1 753 m), weight 105 kg (232 lb 5 8 oz), SpO2 97 %  ,Body mass index is 34 31 kg/m²  Intake/Output Summary (Last 24 hours) at 11/26/17 1329  Last data filed at 11/26/17 0009   Gross per 24 hour   Intake             1180 ml   Output                0 ml   Net             1180 ml       Physical Exam: wound unchanged, ID macerations to remaining interspaces, no purulence, no fluctuence, no malodor, no POP, no sensation, diminished pulses, thin shiny skin      Invasive Devices     Peripheral Intravenous Line            Peripheral IV 11/21/17 Right Antecubital 5 days              Ceretec Scan  IMPRESSION:  1  Persistent radiotracer activity in the distal right foot in the region of the 3rd through 4th toes, likely representing soft tissue infection with probable osteomyelitis

## 2017-11-26 NOTE — PROGRESS NOTES
[facility] Progress Note  Patient: Valery Colon 46 y o  male   MRN: [id number]  PCP: Keysha Olivier MD  Unit/Bed#: -[facility] Encounter: [id number]  Date Of Visit: 17    Assessment/Plan:          · Osteomyelitis right foot toes  WBC tagged scan results reviewed with patient  Podiatry to follow up  I think he has underlying osteomyelitis  ?  Use of long-term IV antibiotics versus surgical intervention  Would wait for podiatry input regarding that  History of foot injury in the recent past basically leading to his issues in addition to uncontrolled diabetes mellitus  Changed antibiotics to Ancef  Also oral Flagyl  ? Need for Flagyl  Would wait for Infectious Disease consultant input  · Diabetes mellitus type 2 uncontrolled  Again encouraged on healthy diet and exercise and to better control his sugars  · Obesity  Patient is overweight  Again encouraged on diet and exercise  · Acute kidney injury-resolved  · Essential hypertension  Continue with current treatment      VTE Pharmacologic Prophylaxis:   Pharmacologic: Heparin  Mechanical VTE Prophylaxis in Place:  Left leg only    Patient Centered Rounds: I have performed bedside rounds with nursing staff today  Discussions with Specialists or Other Care Team Provider:  Yes    Education and Discussions with Family / Patient:  Yes        Current Length of Stay: 5 day(s)    Current Patient Status: Inpatient   Certification Statement: The patient will continue to require additional inpatient hospital stay due to IV antibiotics and further treatment for his foot infection    Discharge Plan:  Home when stable    Code Status: Level 1 - Full Code      Subjective:   Patient feeling nauseous and having some upset stomach/reflux although no diarrhea    No fever or chills    Objective:     Vitals:   Temp (24hrs), Av 5 °F (36 9 °C), Min:98 4 °F (36 9 °C), Max:98 5 °F (36 9 °C)    HR:  [70-80] 80  Resp:  [18] 18  BP: (130-183)/(70-90) 183/90  SpO2:  [95 %-97 %] 97 %  Body mass index is 34 31 kg/m²  Input and Output Summary (last 24 hours): Intake/Output Summary (Last 24 hours) at 11/26/17 0830  Last data filed at 11/26/17 0009   Gross per 24 hour   Intake             1180 ml   Output                0 ml   Net             1180 ml           Physical Exam:     Vital signs reviewed as above  Up in the bed  Has dressing on his right foot  Chest mostly clear to auscultation  S1 and S2 are audible  Abdomen is obese  Nontender  Bowel sounds are audible  Mood and affect are pleasant      Additional Data:     Labs:      Results from last 7 days  Lab Units 11/25/17  0542   WBC Thousand/uL 10 42*   HEMOGLOBIN g/dL 11 4*   HEMATOCRIT % 35 9*   PLATELETS Thousands/uL 374   NEUTROS PCT % 73   LYMPHS PCT % 17   MONOS PCT % 5   EOS PCT % 5       Results from last 7 days  Lab Units 11/25/17  0542   SODIUM mmol/L 136   POTASSIUM mmol/L 4 2   CHLORIDE mmol/L 105   CO2 mmol/L 24   BUN mg/dL 18   CREATININE mg/dL 1 20   CALCIUM mg/dL 9 0   GLUCOSE RANDOM mg/dL 172*       Results from last 7 days  Lab Units 11/21/17  2147   INR  1 09       * I Have Reviewed All Lab Data Listed Above  * Additional Pertinent Lab Tests Reviewed: All Labs Within Last 24 Hours Reviewed    Delete   Recent Cultures (last 7 days):       Results from last 7 days  Lab Units 11/22/17  1429 11/21/17  2238 11/21/17  2148 11/21/17  1633 11/21/17  1629   BLOOD CULTURE   --  No Growth at 72 hrs  No Growth at 72 hrs  No Growth After 4 Days  No Growth After 4 Days     GRAM STAIN RESULT  2+ Polys  2+ Gram positive cocci in pairs  2+ Gram negative rods  --   --   --   --    WOUND CULTURE  2+ Growth of Staphylococcus aureus*  2+ Growth of Beta Hemolytic Streptococcus Group B*  1+ Growth of   --   --   --   --        Last 24 Hours Medication List:     cefepime 2,000 mg Intravenous Q12H   Empagliflozin 1 tablet Oral Daily   heparin (porcine) 5,000 Units Subcutaneous Q8H [address]   insulin glargine 24 Units Subcutaneous HS   insulin lispro 1-6 Units Subcutaneous TID AC   insulin lispro 1-6 Units Subcutaneous HS   insulin lispro 6 Units Subcutaneous Daily With Breakfast   metoprolol tartrate 50 mg Oral Once   metoprolol tartrate 50 mg Oral Q12H ELODIA   metroNIDAZOLE 500 mg Oral Q8H [address]   saccharomyces boulardii 250 mg Oral BID        Today, Patient Was Seen By: Obdulia Wing MD    ** Please Note: Dragon 360 Dictation voice to text software may have been used in the creation of this document   **

## 2017-11-26 NOTE — SOCIAL WORK
Cm met w pt who resides in [facility] his wife, Rosy Muse, [phone] and daughter Arcelia Travis, [phone]  Pt has no POA/AD  2 story home  Flight to second floor  Pt ind w adls  Drives  Has multiple canes, but does not use them  Uses [facility] [address] for RXs  Pt agreeable to home RN for cellulitis  No preference  [facility] not in network w [facility]  Referral sent to [facility] and [facility] home health  No other reported needs at this time

## 2017-11-27 ENCOUNTER — GENERIC CONVERSION - ENCOUNTER (OUTPATIENT)
Dept: OTHER | Facility: OTHER | Age: 52
End: 2017-11-27

## 2017-11-27 LAB
BACTERIA BLD CULT: NORMAL
BACTERIA BLD CULT: NORMAL
GLUCOSE SERPL-MCNC: 151 MG/DL (ref 65–140)
GLUCOSE SERPL-MCNC: 160 MG/DL (ref 65–140)
GLUCOSE SERPL-MCNC: 169 MG/DL (ref 65–140)
GLUCOSE SERPL-MCNC: 223 MG/DL (ref 65–140)

## 2017-11-27 PROCEDURE — 05HY33Z INSERTION OF INFUSION DEVICE INTO UPPER VEIN, PERCUTANEOUS APPROACH: ICD-10-PCS | Performed by: INTERNAL MEDICINE

## 2017-11-27 PROCEDURE — 82948 REAGENT STRIP/BLOOD GLUCOSE: CPT

## 2017-11-27 PROCEDURE — C1751 CATH, INF, PER/CENT/MIDLINE: HCPCS

## 2017-11-27 PROCEDURE — 36569 INSJ PICC 5 YR+ W/O IMAGING: CPT

## 2017-11-27 RX ORDER — METRONIDAZOLE 500 MG/1
500 TABLET ORAL EVERY 8 HOURS SCHEDULED
Qty: 126 TABLET | Refills: 0 | Status: SHIPPED | OUTPATIENT
Start: 2017-11-27 | End: 2018-01-08

## 2017-11-27 RX ORDER — INSULIN GLARGINE 100 [IU]/ML
35 INJECTION, SOLUTION SUBCUTANEOUS
Status: DISCONTINUED | OUTPATIENT
Start: 2017-11-27 | End: 2017-11-28 | Stop reason: HOSPADM

## 2017-11-27 RX ORDER — SACCHAROMYCES BOULARDII 250 MG
250 CAPSULE ORAL 2 TIMES DAILY
Qty: 56 CAPSULE | Refills: 0 | Status: SHIPPED | OUTPATIENT
Start: 2017-11-27 | End: 2017-12-25

## 2017-11-27 RX ORDER — METRONIDAZOLE 500 MG/1
500 TABLET ORAL EVERY 8 HOURS SCHEDULED
Qty: 84 TABLET | Refills: 0 | Status: SHIPPED | OUTPATIENT
Start: 2017-11-27 | End: 2017-11-27

## 2017-11-27 RX ORDER — METOPROLOL TARTRATE 50 MG/1
50 TABLET, FILM COATED ORAL EVERY 12 HOURS SCHEDULED
Qty: 60 TABLET | Refills: 2 | Status: SHIPPED | OUTPATIENT
Start: 2017-11-27

## 2017-11-27 RX ADMIN — INSULIN LISPRO 1 UNITS: 100 INJECTION, SOLUTION INTRAVENOUS; SUBCUTANEOUS at 17:29

## 2017-11-27 RX ADMIN — HEPARIN SODIUM 5000 UNITS: 5000 INJECTION, SOLUTION INTRAVENOUS; SUBCUTANEOUS at 22:05

## 2017-11-27 RX ADMIN — CEFAZOLIN SODIUM 2000 MG: 2 SOLUTION INTRAVENOUS at 00:51

## 2017-11-27 RX ADMIN — CEFAZOLIN SODIUM 2000 MG: 2 SOLUTION INTRAVENOUS at 17:24

## 2017-11-27 RX ADMIN — ONDANSETRON 4 MG: 2 INJECTION INTRAMUSCULAR; INTRAVENOUS at 06:15

## 2017-11-27 RX ADMIN — INSULIN LISPRO 2 UNITS: 100 INJECTION, SOLUTION INTRAVENOUS; SUBCUTANEOUS at 22:05

## 2017-11-27 RX ADMIN — INSULIN LISPRO 1 UNITS: 100 INJECTION, SOLUTION INTRAVENOUS; SUBCUTANEOUS at 09:13

## 2017-11-27 RX ADMIN — METOCLOPRAMIDE 10 MG: 5 INJECTION, SOLUTION INTRAMUSCULAR; INTRAVENOUS at 09:09

## 2017-11-27 RX ADMIN — HYDRALAZINE HYDROCHLORIDE 5 MG: 20 INJECTION INTRAMUSCULAR; INTRAVENOUS at 08:03

## 2017-11-27 RX ADMIN — Medication 250 MG: at 18:20

## 2017-11-27 RX ADMIN — CEFAZOLIN SODIUM 2000 MG: 2 SOLUTION INTRAVENOUS at 08:06

## 2017-11-27 RX ADMIN — METRONIDAZOLE 500 MG: 500 TABLET ORAL at 22:05

## 2017-11-27 RX ADMIN — HEPARIN SODIUM 5000 UNITS: 5000 INJECTION, SOLUTION INTRAVENOUS; SUBCUTANEOUS at 13:42

## 2017-11-27 RX ADMIN — METRONIDAZOLE 500 MG: 500 TABLET ORAL at 15:48

## 2017-11-27 RX ADMIN — INSULIN LISPRO 1 UNITS: 100 INJECTION, SOLUTION INTRAVENOUS; SUBCUTANEOUS at 13:38

## 2017-11-27 RX ADMIN — INSULIN GLARGINE 35 UNITS: 100 INJECTION, SOLUTION SUBCUTANEOUS at 22:05

## 2017-11-27 RX ADMIN — METOPROLOL TARTRATE 50 MG: 50 TABLET, FILM COATED ORAL at 22:05

## 2017-11-27 RX ADMIN — HEPARIN SODIUM 5000 UNITS: 5000 INJECTION, SOLUTION INTRAVENOUS; SUBCUTANEOUS at 06:13

## 2017-11-27 RX ADMIN — METOPROLOL TARTRATE 50 MG: 50 TABLET, FILM COATED ORAL at 08:02

## 2017-11-27 NOTE — PLAN OF CARE
Problem: INFECTION - ADULT  Goal: Absence of fever/infection during neutropenic period  INTERVENTIONS:  - Monitor WBC  - Implement neutropenic guidelines   Outcome: Completed Date Met: 11/27/17

## 2017-11-27 NOTE — PLAN OF CARE
CARDIOVASCULAR - ADULT     Maintains optimal cardiac output and hemodynamic stability Progressing        DISCHARGE PLANNING     Discharge to home or other facility with appropriate resources Progressing        DISCHARGE PLANNING - CARE MANAGEMENT     Discharge to post-acute care or home with appropriate resources Progressing        HEMATOLOGIC - ADULT     Maintains hematologic stability Progressing        INFECTION - ADULT     Absence or prevention of progression during hospitalization Progressing        Knowledge Deficit     Patient/family/caregiver demonstrates understanding of disease process, treatment plan, medications, and discharge instructions Progressing        METABOLIC, FLUID AND ELECTROLYTES - ADULT     Glucose maintained within target range Progressing        MUSCULOSKELETAL - ADULT     Maintain proper alignment of affected body part Progressing        Nutrition/Hydration-ADULT     Nutrient/Hydration intake appropriate for improving, restoring or maintaining nutritional needs Progressing        PAIN - ADULT     Verbalizes/displays adequate comfort level or baseline comfort level Progressing        Potential for Falls     Patient will remain free of falls Progressing        SAFETY ADULT     Patient will remain free of falls Progressing     Maintain or return to baseline ADL function Progressing     Maintain or return mobility status to optimal level Progressing        SKIN/TISSUE INTEGRITY - ADULT     Skin integrity remains intact Progressing     Incision(s), wounds(s) or drain site(s) healing without S/S of infection Progressing     Oral mucous membranes remain intact Progressing

## 2017-11-27 NOTE — PROCEDURES
PICC Line Insertion  Date/Time: 11/27/2017 2:50 PM  Performed by: Artis Jay by: Lisette Hendricks     Patient location:  Bedside  Other Assisting Provider: No    Consent:     Consent obtained:  Verbal and written    Consent given by:  Patient    Risks discussed:  Arterial puncture, bleeding, infection, incorrect placement and nerve damage    Alternatives discussed:  No treatment and delayed treatment  Universal protocol:     Procedure explained and questions answered to patient or proxy's satisfaction: yes      Relevant documents present and verified: yes      Site/side marked: yes      Immediately prior to procedure, a time out was called: yes      Patient identity confirmed:  Verbally with patient and arm band  Pre-procedure details:     Hand hygiene: Hand hygiene performed prior to insertion      Sterile barrier technique: All elements of maximal sterile technique followed      Skin preparation:  ChloraPrep    Skin preparation agent: Skin preparation agent completely dried prior to procedure    Indications:     PICC line indications: long term antibiotics    Anesthesia (see MAR for exact dosages):      Anesthesia method:  Local infiltration    Local anesthetic:  Lidocaine 1% w/o epi  Procedure details:     Location:  Basilic    Vessel type: vein      Laterality:  Right    Approach: percutaneous technique used      Patient position:  Flat    Procedural supplies:  Double lumen    Catheter size:  5 Fr    Landmarks identified: yes      Ultrasound guidance: yes      Sterile ultrasound techniques: Sterile gel and sterile probe covers were used      Number of attempts:  1    Successful placement: yes      Vessel of catheter tip end:  Sherlock 3CG confirmed    Total catheter length (cm):  45    Catheter out on skin (cm):  0    Max flow rate:  5ML/SEC    Arm circumference:  34  Post-procedure details:     Post-procedure:  Dressing applied and securement device placed    Assessment:  Blood return through all ports and free fluid flow    Post-procedure complications: none      Patient tolerance of procedure:   Tolerated well, no immediate complications  Comments:      LOT # on 3CG sheet

## 2017-11-27 NOTE — SOCIAL WORK
CM spoke to [address] at [facility] [phone] who states they will be able to start their visit tomorrow afternoon at 4:30 PM to educate patient on IV antibiotic and do wound care  Spoke to Kasi Osorio at [place] [address] [phone] who states she will visit patient in the morning to go over paperwork and education  Patient's home IV will be delivered at home before 12PM  Patient will be discharge home tomorrow    CM will follow up with needs

## 2017-11-27 NOTE — PROGRESS NOTES
Progress Note - Infectious Disease   Cezar Erps 46 y o  male MRN: [id number]  Unit/Bed#: -01 Encounter: [id number]      Impression/Recommendations:  1   Right foot cellulitis, secondary to dirty nail puncture wound   Patient is clinically improved and remains systemically well   Wound culture grew MSSA in GBS  Blood cultures remain negative  Antibiotic plan as in below  Serial foot exam      2   Probable osteomyelitis of right 3rd and 4th toes   Once again, I recommend to the patient toe amputation  It appears the podiatrist recommend the same  Patient abdomen refuses  He wants IV antibiotic and 2nd opinion with his outpatient podiatrist   Continue IV cefazolin with p o  Flagyl  Treat x6 weeks total, another 36 days  Recommend toe amputation      3   Dirty nail puncture injury   Antibiotic plan as in above   Patient is also at high risk for tetanus   He did receive TD booster last week      4   LOKESH, superimposed on CKD   This is most likely secondary to acute infection   Creatinine normalized  Antibiotic dosages adjusted accordingly  Monitor creatinine      5   Noncompliant DM   Hemoglobin A1c on admission is 9 3   This clearly contributes to infection development   This will also contribute to poor healing potential   Management per Medicine Service  Discussed with patient in great detail regarding the need to control blood sugar to avoid future infections      6   Neuropathy   This, together with noncompliance, puts patient at very high risk for future limb loss from injuries      Discussed with patient in detail regarding the above plan  I strongly recommended toe amputation but patient adamantly refuses  I cautioned him risk of recurrent infection, leading to more proximal infection, requiring more proximal amputation  I also cautioned him the risk of recurrence infection causing sepsis    Lastly, I cautioned him the risk of high-dose long-term IV antibiotic via PICC line including, but not limited to, kidney failure, bone marrow suppression, blood clot and blood infection  Discussed with slim service  Plan for discharge today noted  Follow-up with me in 2-3 weeks  Patient's caution to return to the ER if foot appears worse or if he is febrile      Antibiotics:  Cefazolin/Flagyl   Antibiotic # 6     Subjective:  Patient states right foot still with minimal pain  He is ambulating on it  Temperature stays down   No chills  He is tolerating antibiotics well   No nausea, vomiting or diarrhea  Objective:  Vitals:  HR:  [76-85] 81  Resp:  [18] 18  BP: (163-210)/() 163/77  SpO2:  [95 %-97 %] 96 %  Temp (24hrs), Av 5 °F (36 9 °C), Min:98 5 °F (36 9 °C), Max:98 6 °F (37 °C)  Current: Temperature: 98 5 °F (36 9 °C)    Physical Exam:     General: Awake, alert, cooperative, no distress  Lungs: Expansion symmetric, no rales, no wheezing, respirations unlabored  Heart[de-identified]  Regular rate and rhythm, S1 and S2 normal, no murmur  Abdomen: Soft, nondistended, non-tender, bowel sounds active all four quadrants,        no masses, no organomegaly  Extremities: Right foot wound stable  No purulence  Improved erythema  No tenderness  Skin:  No rash  Invasive Devices     Peripherally Inserted Central Catheter Line            PICC Line  Right Basilic less than 1 day          Peripheral Intravenous Line            Peripheral IV 17 Left Forearm less than 1 day                Labs studies:   I have personally reviewed pertinent labs      Results from last 7 days  Lab Units 17  0542 17  0449 17  1641 17  1629   SODIUM mmol/L 136 139  --  134*   POTASSIUM mmol/L 4 2 3 9  --  4 4   CHLORIDE mmol/L 105 106  --  99*   CO2 mmol/L 24 24  --  27   ANION GAP mmol/L 7 9  --  8   BUN mg/dL 18 13  --  17   CREATININE mg/dL 1 20 1 35*  --  1 73*   EGFR ml/min/1 73sq m 69 60 49 44   GLUCOSE RANDOM mg/dL 172* 159*  --  354*   GLUCOSE, ISTAT mg/dl  --   --  341*  -- CALCIUM mg/dL 9 0 8 6  --  9 3       Results from last 7 days  Lab Units 11/25/17  0542 11/23/17  0516 11/22/17  0448   WBC Thousand/uL 10 42* 10 45* 10 68*   HEMOGLOBIN g/dL 11 4* 10 8* 10 7*   PLATELETS Thousands/uL 374 354 340       Results from last 7 days  Lab Units 11/22/17  1429 11/21/17  2245 11/21/17  2238 11/21/17  2148 11/21/17  1633 11/21/17  1629   BLOOD CULTURE   --   --  No Growth After 5 Days  No Growth After 5 Days  No Growth After 5 Days  No Growth After 5 Days  GRAM STAIN RESULT  2+ Polys  2+ Gram positive cocci in pairs  2+ Gram negative rods  --   --   --   --   --    WOUND CULTURE  2+ Growth of Staphylococcus aureus*  2+ Growth of Beta Hemolytic Streptococcus Group B*  1+ Growth of   --   --   --   --   --    MRSA CULTURE ONLY   --  No Methicillin Resistant Staphlyococcus aureus (MRSA) isolated  --   --   --   --        Imaging Studies:   I have personally reviewed pertinent imaging study reports and images in PACS  Ceretec scan reviewed  There is osteomyelitis of right 3rd and 4th toes  EKG, Pathology, and Other Studies:   I have personally reviewed pertinent reports

## 2017-11-27 NOTE — PROGRESS NOTES
[facility] Progress Note  Patient: Valery Colon 46 y o  male   MRN: [id number]  PCP: Keysha Olivier MD  Unit/Bed#: -Luiz Encounter: [id number]  Date Of Visit: 11/27/17    Assessment/Plan:    Principal Problem:    Osteomyelitis ([facility] [address] )  Active Problems:    Foot injury    Diabetes mellitus ([facility] [address] )    Hypertension    Obesity    Acute kidney injury ([facility] [address] )    Cellulitis and abscess of toe of right foot    Present on Admission:   Foot injury   Diabetes mellitus ([facility] [address] )   Hypertension   Obesity   Acute kidney injury ([facility] [address] )   Cellulitis and abscess of toe of right foot   Osteomyelitis (HCC)      · Osteomyelitis right foot toes  He also has surrounding cellulitis/diabetic foot ulcer  Patient had PICC line placed today  On IV antibiotics  I have been told by case management that patient will not be able to get his antibiotics today  I have asked to skip his midnight dose and he can continue his antibiotics from tomorrow morning  However, the company who is delivering medications cannot even delivered the morning dose by 9:00 a m  I have been told that they cannot guarantee delivery of the antibiotics on time  As per case management, this is the 2nd company she has tried  Therefore, I had to cancel patient's discharge for today  Arrangements would be made for tomorrow for discharge  · Diabetes mellitus type 2 uncontrolled    Continue with current treatment and sliding scale  · Hypertension-continue home medication      Note:  Amputation has been recommended by Infectious Disease consultant and a podiatry, however, patient does not want to have his toes amputated although he is aware of the risk of losing more part of his right leg and also aware of the risk that infection may spread further    VTE Pharmacologic Prophylaxis:   Pharmacologic: Enoxaparin (Lovenox)  Mechanical VTE Prophylaxis in Place: Yes-left leg only    Patient Centered Rounds: I have performed bedside rounds with nursing staff today  Discussions with Specialists or Other Care Team Provider:  Yes    Education and Discussions with Family / Patient:  Yes    Time Spent for Care: 45 minutes  More than 50% of total time spent on counseling and coordination of care as described above  Current Length of Stay: 6 day(s)    Current Patient Status: Inpatient   Certification Statement: The patient will continue to require additional inpatient hospital stay due to Until arranged for home IV antibiotics    Discharge Plan:  Patient is ready to be discharged  His antibiotic has not been arranged    Code Status: Level 1 - Full Code      Subjective:   No new complaints  Had his dressing of the right foot changed yesterday  No chest pain or shortness of breath  No fever or chills  Feeling nauseous secondary to antibiotic    Objective:     Vitals:   Temp (24hrs), Av 5 °F (36 9 °C), Min:98 5 °F (36 9 °C), Max:98 5 °F (36 9 °C)    HR:  [81-85] 81  Resp:  [18] 18  BP: (163-210)/() 163/77  SpO2:  [95 %-96 %] 96 %  Body mass index is 34 31 kg/m²  Input and Output Summary (last 24 hours): Intake/Output Summary (Last 24 hours) at 17 1606  Last data filed at 17 1100   Gross per 24 hour   Intake              200 ml   Output              500 ml   Net             -300 ml           Physical Exam:     Vital signs are reviewed as above  Constitutional:  Sitting up in bed    Eyes: EOM grossly intact  HENT: Oropharynx are moist    Neck: Neck is supple  Cardiac: I did not hear any rubs or gallop  Patient appears to be in sinus  rhythm  Respiratory: Patient not in significant respiratory distress  Air entry in general is fair  GI: Abdomen is obese soft  It is grossly nontender  Bowel sounds are adequate  I was not able to appreciate any hepatosplenomegaly  Neurologic:  Patient is awake and alert  Neurological examination is grossly intact  No obvious focal neurological deficit noticed    Skin:  Has dressing on his right foot   Psychiatric: Mood and affect are pleasant  Musculoskeletal   Moving his arms and left leg  Has dressing on his right foot  Extremities:  Has edema of his right foot/leg       Additional Data:     Labs:      Results from last 7 days  Lab Units 11/25/17  0542   WBC Thousand/uL 10 42*   HEMOGLOBIN g/dL 11 4*   HEMATOCRIT % 35 9*   PLATELETS Thousands/uL 374   NEUTROS PCT % 73   LYMPHS PCT % 17   MONOS PCT % 5   EOS PCT % 5       Results from last 7 days  Lab Units 11/25/17  0542   SODIUM mmol/L 136   POTASSIUM mmol/L 4 2   CHLORIDE mmol/L 105   CO2 mmol/L 24   BUN mg/dL 18   CREATININE mg/dL 1 20   CALCIUM mg/dL 9 0   GLUCOSE RANDOM mg/dL 172*       Results from last 7 days  Lab Units 11/21/17  2147   INR  1 09       * I Have Reviewed All Lab Data Listed Above  * Additional Pertinent Lab Tests Reviewed: No New Labs Available For Today      Recent Cultures (last 7 days):       Results from last 7 days  Lab Units 11/22/17  1429 11/21/17  2238 11/21/17  2148 11/21/17  1633 11/21/17  1629   BLOOD CULTURE   --  No Growth After 5 Days  No Growth After 5 Days  No Growth After 5 Days  No Growth After 5 Days     GRAM STAIN RESULT  2+ Polys  2+ Gram positive cocci in pairs  2+ Gram negative rods  --   --   --   --    WOUND CULTURE  2+ Growth of Staphylococcus aureus*  2+ Growth of Beta Hemolytic Streptococcus Group B*  1+ Growth of   --   --   --   --        Last 24 Hours Medication List:     cefazolin 2,000 mg Intravenous Q8H   Empagliflozin 1 tablet Oral Daily   heparin (porcine) 5,000 Units Subcutaneous Q8H [address]   insulin glargine 35 Units Subcutaneous HS   insulin lispro 1-6 Units Subcutaneous TID AC   insulin lispro 1-6 Units Subcutaneous HS   insulin lispro 6 Units Subcutaneous Daily With Breakfast   metoprolol tartrate 50 mg Oral Once   metoprolol tartrate 50 mg Oral Q12H ELODIA   metroNIDAZOLE 500 mg Oral Q8H [address]   saccharomyces boulardii 250 mg Oral BID        Today, Patient Was Seen By: Dahlia Hagen, MD    ** Please Note: Dragon 360 Dictation voice to text software may have been used in the creation of this document   **

## 2017-11-27 NOTE — CASE MANAGEMENT
Continued Stay Review    Date: 11/27/17    Vital Signs: BP (!) 173/82   Pulse 76   Temp 98 5 °F (36 9 °C) (Oral)   Resp 18   Ht 5' 9" (1 753 m)   Wt 105 kg (232 lb 5 8 oz)   SpO2 97%   BMI 34 31 kg/m²     Medications:   Scheduled Meds:   cefazolin 2,000 mg Intravenous Q8H   Empagliflozin 1 tablet Oral Daily   heparin (porcine) 5,000 Units Subcutaneous Q8H [address]   insulin glargine 35 Units Subcutaneous HS   insulin lispro 1-6 Units Subcutaneous TID AC   insulin lispro 1-6 Units Subcutaneous HS   insulin lispro 6 Units Subcutaneous Daily With Breakfast   metoprolol tartrate 50 mg Oral Once   metoprolol tartrate 50 mg Oral Q12H ELODIA   metroNIDAZOLE 500 mg Oral Q8H [address]   saccharomyces boulardii 250 mg Oral BID     Continuous Infusions:    PRN Meds: hydrALAZINE    metoclopramide    ondansetron    Abnormal Labs/Diagnostic Results:       Age/Sex: 46 y o  male     Assessment/Plan:   Principal Problem:    Osteomyelitis ([address] )  Active Problems:    Foot injury    Diabetes mellitus ([address] )    Hypertension    Obesity    Acute kidney injury ([facility] [address] )    Cellulitis and abscess of toe of right foot     Present on Admission:   Foot injury   Diabetes mellitus ([address] )   Hypertension   Obesity   Acute kidney injury ([facility] [address] )   Cellulitis and abscess of toe of right foot   Osteomyelitis (HCC)  · Osteomyelitis right foot toes  He also has surrounding cellulitis/diabetic foot ulcer  Patient had PICC line placed today  On IV antibiotics  Diabetes mellitus type 2 uncontrolled    Continue with current treatment and sliding scale  · Hypertension-continue home medication  Note:  Amputation has been recommended by Infectious Disease consultant and a podiatry, however, patient does not want to have his toes amputated although he is aware of the risk of losing more part of his right leg and also aware of the risk that infection may spread further    PER ID  1   Right foot cellulitis, secondary to dirty nail puncture wound   Patient is clinically improved and remains systemically well   Wound culture grew MSSA in GBS  Blood cultures remain negative  Antibiotic plan as in below  Serial foot exam      2   Probable osteomyelitis of right 3rd and 4th toes   Once again, I recommend to the patient toe amputation  It appears the podiatrist recommend the same  Patient abdomen refuses  He wants IV antibiotic and 2nd opinion with his outpatient podiatrist   Continue IV cefazolin with p o  Flagyl  Treat x6 weeks total, another 36 days  Recommend toe amputation      3   Dirty nail puncture injury   Antibiotic plan as in above   Patient is also at high risk for tetanus   He did receive TD booster last week      4   LOKESH, superimposed on CKD   This is most likely secondary to acute infection   Creatinine normalized  Antibiotic dosages adjusted accordingly  Monitor creatinine      5   Noncompliant DM   Hemoglobin A1c on admission is 9 3   This clearly contributes to infection development   This will also contribute to poor healing potential   Management per Medicine Service  Discussed with patient in great detail regarding the need to control blood sugar to avoid future infections      6   Neuropathy   This, together with noncompliance, puts patient at very high risk for future limb loss from injuries      Discussed with patient in detail regarding the above plan  I strongly recommended toe amputation but patient adamantly refuses  I cautioned him risk of recurrent infection, leading to more proximal infection, requiring more proximal amputation  I also cautioned him the risk of recurrence infection causing sepsis  Discharge Plan: TBD  [address] in the [facility] by [facility] for 2017  Network Utilization Review Department  Phone: [phone]; Fax [phone]  ATTENTION: The Network Utilization Review Department is now centralized for our 7 Facilities   Make a note that we have a new phone and fax numbers for our Department  Please call with any questions or concerns to [phone] and carefully follow the prompts so that you are directed to the right person  All voicemails are confidential  Fax any determinations, approvals, denials, and requests for initial or continue stay review clinical to [phone]  Due to HIGH CALL volume, it would be easier if you could please send faxed requests to expedite your requests and in part, help us provide discharge notifications faster

## 2017-11-27 NOTE — PLAN OF CARE
Maintains optimal cardiac output and hemodynamic stability Progressing      Discharge to post-acute care or home with appropriate resources Progressing      Maintains hematologic stability Progressing      Absence or prevention of progression during hospitalization Progressing      Patient/family/caregiver demonstrates understanding of disease process, treatment plan, medications, and discharge instructions Progressing      Glucose maintained within target range Progressing      Maintain proper alignment of affected body part Progressing      Nutrient/Hydration intake appropriate for improving, restoring or maintaining nutritional needs Progressing      Verbalizes/displays adequate comfort level or baseline comfort level Progressing      Patient will remain free of falls Progressing      Maintain or return to baseline ADL function Progressing      Skin integrity remains intact Progressing      Incision(s), wounds(s) or drain site(s) healing without S/S of infection Progressing

## 2017-11-28 VITALS
TEMPERATURE: 98.5 F | SYSTOLIC BLOOD PRESSURE: 173 MMHG | HEART RATE: 76 BPM | RESPIRATION RATE: 18 BRPM | OXYGEN SATURATION: 97 % | BODY MASS INDEX: 34.42 KG/M2 | HEIGHT: 69 IN | WEIGHT: 232.37 LBS | DIASTOLIC BLOOD PRESSURE: 82 MMHG

## 2017-11-28 LAB
GLUCOSE SERPL-MCNC: 129 MG/DL (ref 65–140)
GLUCOSE SERPL-MCNC: 98 MG/DL (ref 65–140)

## 2017-11-28 PROCEDURE — 82948 REAGENT STRIP/BLOOD GLUCOSE: CPT

## 2017-11-28 RX ADMIN — Medication 250 MG: at 08:42

## 2017-11-28 RX ADMIN — INSULIN LISPRO 6 UNITS: 100 INJECTION, SOLUTION INTRAVENOUS; SUBCUTANEOUS at 08:33

## 2017-11-28 RX ADMIN — METOPROLOL TARTRATE 50 MG: 50 TABLET, FILM COATED ORAL at 08:41

## 2017-11-28 RX ADMIN — CEFAZOLIN SODIUM 2000 MG: 2 SOLUTION INTRAVENOUS at 08:40

## 2017-11-28 RX ADMIN — HEPARIN SODIUM 5000 UNITS: 5000 INJECTION, SOLUTION INTRAVENOUS; SUBCUTANEOUS at 06:45

## 2017-11-28 RX ADMIN — CEFAZOLIN SODIUM 2000 MG: 2 SOLUTION INTRAVENOUS at 01:27

## 2017-11-28 RX ADMIN — HYDRALAZINE HYDROCHLORIDE 5 MG: 20 INJECTION INTRAMUSCULAR; INTRAVENOUS at 01:28

## 2017-11-28 RX ADMIN — METRONIDAZOLE 500 MG: 500 TABLET ORAL at 08:41

## 2017-11-28 NOTE — CASE MANAGEMENT
Notification of Discharge  This is a Notification of Discharge from our facility [address]  Please be advised that this patient has been discharge from our facility  Below you will find the admission and discharge date and time including the patients disposition  PRESENTATION DATE: 11/21/2017  3:41 PM  IP ADMISSION DATE: 11/21/17 1713  DISCHARGE DATE: 11/28/2017 12:41 PM  DISPOSITION: Home with [address] [place] [address] in the [facility] by [facility] for 2017  Network Utilization Review Department  Phone: [phone]; Fax [phone]  ATTENTION: The Network Utilization Review Department is now centralized for our 7 Facilities  Make a note that we have a new phone and fax numbers for our Department  Please call with any questions or concerns to [phone] and carefully follow the prompts so that you are directed to the right person  All voicemails are confidential  Fax any determinations, approvals, denials, and requests for initial or continue stay review clinical to [phone]  Due to HIGH CALL volume, it would be easier if you could please send faxed requests to expedite your requests and in part, help us provide discharge notifications faster

## 2017-11-28 NOTE — SOCIAL WORK
CM met with patient at bedside  Patient's wife-Devi is present for the visit  Patient is scheduled for discharge today  Keturah Rodriguez [phone]-- the liason for [facility] is currently visiting with patient to provide education for patient's at home infusion  Patient will have services from [facility] phone# [phone] and fax# [phone]  [facility] nurse will visit patient at 4:30PM today    CM will follow up needs

## 2017-11-28 NOTE — NURSING NOTE
Discharge instructions reviewed with patient,Diabetic education provided via literature  Pt very receptive of the information  Picc lines flushed and capped  No questions and no issues  Patient discharged home   Discharge instructions faxed to [facility]

## 2017-11-28 NOTE — PROGRESS NOTES
[facility] Internal Medicine Progress Note  Patient: Ty Canas 46 y o  male   MRN: [id number]  PCP: Georgia Montano MD  Unit/Bed#: MS 328K[facility]ina Encounter: [id number]  Date Of Visit: 17    Assessment:    Principal Problem:    Osteomyelitis ([facility] )  Active Problems:    Foot injury    Diabetes mellitus ([facility] )    Hypertension    Obesity    Acute kidney injury ([facility] )    Cellulitis and abscess of toe of right foot      Plan:    1  Right leg cellulitis, dirty nail puncture wound, wound culture grew MSSA, as per ID plan for antibiotics, WBC scan has also been reviewed probable osteo of the right 3rd and 4th toes, toe amputation has been recommended by both ID and Podiatry, which the patient does not want at this time, continue IV cephazolin with oral Flagyl total 6 weeks  2  Plan for discharge today, spoke with Case Management patient patient's wife  3  Brennen I on CKD likely stage III, creatinine seems to have gone close to his baseline  4  Type 2 diabetes, this time I cannot change his insulin regimen based on information here in the hospital, needs good outpatient follow-up  5  Discussed medication compliance in detail with the patient      VTE Pharmacologic Prophylaxis:   Pharmacologic: Heparin  Mechanical VTE Prophylaxis in Place: No    Patient Centered Rounds: I have performed bedside rounds with nursing staff today  Discussions with Specialists or Other Care Team Provider: y    Education and Discussions with Family / Patient: y    Time Spent for Care: 15 minutes  More than 50% of total time spent on counseling and coordination of care as described above  Current Length of Stay: 7 day(s)    Current Patient Status: Inpatient       Code Status: Level 1 - Full Code      Subjective:   Feels well today    Wants to go home    Objective:     Vitals:   Temp (24hrs), Av 8 °F (37 1 °C), Min:98 5 °F (36 9 °C), Max:99 4 °F (37 4 °C)    HR:  [76-89] 76  Resp:  [18] 18  BP: (162-177)/(77-85) 173/82  SpO2:  [94 %-97 %] 97 %  Body mass index is 34 31 kg/m²  Input and Output Summary (last 24 hours): Intake/Output Summary (Last 24 hours) at 11/28/17 0852  Last data filed at 11/27/17 2000   Gross per 24 hour   Intake              640 ml   Output              250 ml   Net              390 ml       Physical Exam:     Physical Exam   Constitutional: He is oriented to person, place, and time  No distress  HENT:   Head: Normocephalic  Neck: No tracheal deviation present  Cardiovascular: Normal rate  Pulmonary/Chest: No respiratory distress  Abdominal: He exhibits no distension  Neurological: He is alert and oriented to person, place, and time  Skin: He is not diaphoretic  Additional Data:     Labs:      Results from last 7 days  Lab Units 11/25/17  0542   WBC Thousand/uL 10 42*   HEMOGLOBIN g/dL 11 4*   HEMATOCRIT % 35 9*   PLATELETS Thousands/uL 374   NEUTROS PCT % 73   LYMPHS PCT % 17   MONOS PCT % 5   EOS PCT % 5       Results from last 7 days  Lab Units 11/25/17  0542   SODIUM mmol/L 136   POTASSIUM mmol/L 4 2   CHLORIDE mmol/L 105   CO2 mmol/L 24   BUN mg/dL 18   CREATININE mg/dL 1 20   CALCIUM mg/dL 9 0   GLUCOSE RANDOM mg/dL 172*       Results from last 7 days  Lab Units 11/21/17  2147   INR  1 09       * I Have Reviewed All Lab Data Listed Above  * Additional Pertinent Lab Tests Reviewed: [facility] Admission Reviewed    Imaging:      Recent Cultures (last 7 days):       Results from last 7 days  Lab Units 11/22/17  1429 11/21/17  2238 11/21/17  2148 11/21/17  1633 11/21/17  1629   BLOOD CULTURE   --  No Growth After 5 Days  No Growth After 5 Days  No Growth After 5 Days  No Growth After 5 Days     GRAM STAIN RESULT  2+ Polys  2+ Gram positive cocci in pairs  2+ Gram negative rods  --   --   --   --    WOUND CULTURE  2+ Growth of Staphylococcus aureus*  2+ Growth of Beta Hemolytic Streptococcus Group B*  1+ Growth of   --   --   --   --        Last 24 Hours Medication List: cefazolin 2,000 mg Intravenous Q8H   Empagliflozin 1 tablet Oral Daily   heparin (porcine) 5,000 Units Subcutaneous Q8H [facility]   insulin glargine 35 Units Subcutaneous HS   insulin lispro 1-6 Units Subcutaneous TID AC   insulin lispro 1-6 Units Subcutaneous HS   insulin lispro 6 Units Subcutaneous Daily With Breakfast   metoprolol tartrate 50 mg Oral Once   metoprolol tartrate 50 mg Oral Q12H [facility]   metroNIDAZOLE 500 mg Oral Q8H [facility]   saccharomyces boulardii 250 mg Oral BID        Today, Patient Was Seen By: Ambrose Seymour MD    ** Please Note: Dragon 360 Dictation voice to text software may have been used in the creation of this document   **

## 2017-11-29 NOTE — SOCIAL WORK
CM received a phone call from Kamari Walker at [facility] [phone] who informed this CM that patient called her this AM to inform her that a Nurse from [facility] did not visit for the 4:45PM treatment  KWAN phoned Bobby to asked [phone]  why the nurse did not show up  Kaylin Lopez went on to say there was a mix up with the times and day on their end  This CM requested that Kaylin Lopez call patient with a formal apology  This CM also called patient to inform of what happened  CM also left a message for Brian to call back about this case

## 2017-12-12 ENCOUNTER — ALLSCRIPTS OFFICE VISIT (OUTPATIENT)
Dept: OTHER | Facility: OTHER | Age: 52
End: 2017-12-12

## 2017-12-12 DIAGNOSIS — E11.9 TYPE 2 DIABETES MELLITUS WITHOUT COMPLICATIONS (HCC): ICD-10-CM

## 2017-12-18 ENCOUNTER — TRANSCRIBE ORDERS (OUTPATIENT)
Dept: ADMINISTRATIVE | Facility: HOSPITAL | Age: 52
End: 2017-12-18

## 2017-12-18 ENCOUNTER — APPOINTMENT (OUTPATIENT)
Dept: LAB | Facility: HOSPITAL | Age: 52
End: 2017-12-18
Payer: COMMERCIAL

## 2017-12-18 DIAGNOSIS — E11.9 TYPE 2 DIABETES MELLITUS WITHOUT COMPLICATIONS (HCC): ICD-10-CM

## 2017-12-18 LAB
CREAT SERPL-MCNC: 1.57 MG/DL (ref 0.6–1.3)
GFR SERPL CREATININE-BSD FRML MDRD: 58 ML/MIN/1.73SQ M

## 2017-12-18 PROCEDURE — 82565 ASSAY OF CREATININE: CPT

## 2017-12-18 PROCEDURE — 36415 COLL VENOUS BLD VENIPUNCTURE: CPT

## 2017-12-19 ENCOUNTER — GENERIC CONVERSION - ENCOUNTER (OUTPATIENT)
Dept: OTHER | Facility: OTHER | Age: 52
End: 2017-12-19

## 2018-01-17 NOTE — PROCEDURES
Procedures by SHORTY Faith at  11/27/2017  2:50 PM      Author:  SHORTY Faith Service:  Hospitalist Author Type:  Registered Nurse    Filed:  11/27/2017  2:51 PM Date of Service:  11/27/2017  2:50 PM Status:  Attested    :  SHORTY Faith (Registered Nurse)  Cosigner:  Jo Brown MD at 11/27/2017  3:40 PM      Procedure Orders:       1  Insert PICC line [[id number]] ordered by SHORTY Faith at 11/27/17 1450                 Post-procedure Diagnoses:       1  Diabetic foot ulcer ([facility] ) [V66 660, K32 640]              Attestation signed by Jo Brown MD at 11/27/2017  3:40 PM      I only obtain the consent  I did not supervise the procedure whatsoever  PICC Line Insertion  Date/Time: 11/27/2017 2:50 PM  Performed by: Camila Rizo by: Donis Weinstein     Patient location:  Bedside  Other Assisting Provider:  No    Consent:     Consent obtained:  Verbal and written    Consent given by:  Patient    Risks discussed:  Arterial puncture, bleeding, infection, incorrect placement and nerve damage    Alternatives discussed:  No treatment and delayed treatment  Universal protocol:     Procedure explained and questions answered to patient or proxy's satisfaction: yes      Relevant documents present and verified: yes      Site/side marked: yes      Immediately prior to procedure,  a time out was called: yes      Patient identity confirmed:  Verbally with patient and arm band  Pre-procedure details:     Hand hygiene: Hand hygiene performed prior to insertion      Sterile barrier technique: All elements of maximal sterile technique followed      Skin preparation:  ChloraPrep    Skin preparation agent: Skin preparation agent completely dried prior to procedure    Indications:     PICC line indications: long term antibiotics    Anesthesia (see MAR for exact dosages):      Anesthesia method:  Local infiltration    Local anesthetic:  Lidocaine 1% w/o epi  Procedure details:     Location:  Basilic    Vessel type: vein      Laterality:  Right    Approach: percutaneous technique used      Patient position:  Flat    Procedural supplies:  Double lumen    Catheter size:  5 Fr    Landmarks identified: yes      Ultrasound guidance: yes      Sterile ultrasound techniques: Sterile gel and sterile probe covers were used      Number of attempts:  1    Successful placement: yes      Vessel of catheter tip end:  Sherlock 3CG confirmed    Total catheter length (cm):  45    Catheter out on skin (cm):  0    Max flow rate:  5ML/SEC    Arm circumference:  34  Post-procedure details:     Post-procedure:  Dressing applied and securement device placed    Assessment:  Blood return through all ports and free fluid flow    Post-procedure complications: none      Patient tolerance of procedure:   Tolerated well, no immediate complications  Comments:      LOT # on 3CG sheet                     Received for:Provider  EPIC   Nov 27 2017  3:40PM [facility] Standard Time

## 2018-01-23 VITALS
WEIGHT: 220.4 LBS | RESPIRATION RATE: 14 BRPM | BODY MASS INDEX: 32.64 KG/M2 | TEMPERATURE: 98 F | HEART RATE: 78 BPM | HEIGHT: 69 IN | SYSTOLIC BLOOD PRESSURE: 110 MMHG | OXYGEN SATURATION: 99 % | DIASTOLIC BLOOD PRESSURE: 78 MMHG

## 2018-01-23 NOTE — PROGRESS NOTES
Chief Complaint  Chief Complaint Free Text Note Form: R foot cellulitis secondary to dirty nail puncture wound  Wound cx grew MSSA and GBS  Probable osteomyelitis of 3rd and 4th toes   Chief Complaint Chronic Condition [facility]: Patient is here today for follow up of chronic conditions described in HPI  History of Present Illness  HPI: 45 y/o male presents for office f/u regarding R foot cellulitis secondary to dirty nail puncture wound, Wound cx grew MSSA and GBS, and Probable osteomyelitis of 3rd and 4th toes  Patient discharged from the hospital on 11/27/17 on IV cefazolin and po flagyl  Per patient he did not know he was supposed to be taking po flagyl  He has not taken any since his discharge  He was advised amputation of the toe but refused  Patient has been following up with his podiatrist in the / [place] [facility]  He states he sees a lot of improvement in the foot  He has been controlling his blood sugar better through diet  He denies any side effects from the antibiotic  He has no issues with his PICC line  He denies any erythema, drainage or tenderness at PICC insertion site  Patient has not had any blood work done since his hospital discharge  Review of Systems  Complete-Male:   Constitutional: no fever and no chills  ENT: no thrush, but no sore throat  Cardiovascular: no chest pain, no palpitations and no extremity edema  Respiratory: no shortness of breath and no cough  Gastrointestinal: no abdominal pain, no nausea, no vomiting and no diarrhea  Genitourinary: no dysuria  Musculoskeletal: limb swelling  Integumentary: as noted in HPI, no rashes, no itching and no skin lesions  Neurological: no headache  ROS Reviewed:   ROS reviewed  Active Problems    1  Cellulitis of foot (682 7) (L03 119)   2  DMII (diabetes mellitus, type 2) (250 00) (E11 9)    Past Medical History  Active Problems And Past Medical History Reviewed:    The active problems and past medical history were reviewed and updated today  Surgical History  Surgical History Reviewed: The surgical history was reviewed and updated today  Family History  Family History Reviewed: The family history was reviewed and updated today  Social History  Social History Reviewed: The social history was reviewed and is unchanged  Current Meds   1  Bydureon 2 MG Subcutaneous Pen-injector; Therapy: (Recorded:55Xdz0059) to Recorded   2  CeFAZolin in Sodium Chloride 2-0 9 GM/50ML-% Intravenous Solution; INFUSE 2 GM   Every 8 hours Recorded   3  Daily Multivitamin TABS; Therapy: (Recorded:59Lai6947) to Recorded   4  Jardiance TABS; TAKE 1 TABLET BY MOUTH ONCE DAILY; Therapy: (Recorded:[id number]) to Recorded   5  Lisinopril 20 MG Oral Tablet; TAKE 1 TABLET DAILY AS DIRECTED; Therapy: (Recorded:[id number]) to Recorded   6  Toujeo SoloStar 300 UNIT/ML Subcutaneous Solution Pen-injector; INJECT 30 UNIT   Daily; Therapy: (Recorded:76Htf9921) to Recorded  Medication List Reviewed: The medication list was reviewed and updated today  Vitals  Signs   Recorded: 12Dec2017 12:52PM   Temperature: 98 F  Heart Rate: 78  Respiration: 14  Systolic: 773  Diastolic: 78  Height: 5 ft 9 in  Weight: 220 lb 6 4 oz  BMI Calculated: 32 55  BSA Calculated: 2 15  O2 Saturation: 99    Physical Exam    Constitutional   General appearance: No acute distress, well appearing and well nourished  Eyes   Conjunctiva and lids: No swelling, erythema, or discharge  Pupils and irises: Equal, round and reactive to light  Ears, Nose, Mouth, and Throat   External inspection of ears and nose: Normal     Oropharynx: Normal with no erythema, edema, exudate or lesions  no thrush  Pulmonary   Respiratory effort: No increased work of breathing or signs of respiratory distress  Auscultation of lungs: Clear to auscultation, equal breath sounds bilaterally, no wheezes, no rales, no rhonci      Cardiovascular   Palpation of heart: Normal PMI, no thrills  Auscultation of heart: Normal rate and rhythm, normal S1 and S2, without murmurs  Examination of extremities for edema and/or varicosities: Normal     Abdomen   Abdomen: Non-tender, no masses  Liver and spleen: No hepatomegaly or splenomegaly  Musculoskeletal   Inspection/palpation of joints, bones, and muscles: Abnormal   R foot plantar foot wound without erythema  Still with some residual edema of the toes and dorsum of foot  No erythema noted  No purulent drainage or foul odor  Skin   Skin and subcutaneous tissue: Normal without rashes or lesions  PICC line without erythema, discharge or tenderness  Psychiatric   Orientation to person, place and time: Normal     Mood and affect: Normal          Assessment    1  Cellulitis of foot (682 7) (L03 119)   2  Osteomyelitis (730 20) (M86 9)   3  MSSA (methicillin susceptible Staphylococcus aureus) infection (041 11) (A49 01)   4  DMII (diabetes mellitus, type 2) (250 00) (E11 9)    Plan    1  (1) CBC/PLT/DIFF; Status:Active; Requested for:[id number];    2  (1) CREATININE; Status:Active; Requested for:[id number];     3  MetroNIDAZOLE 500 MG Oral Tablet; TAKE 1 TABLET 3 TIMES DAILY UNTIL GONE    Discussion/Summary  Discussion Summary:   R foot cellulitis secondary to dirty nail puncture wound, Wound cx grew MSSA and GBS, Probable osteomyelitis of 3rd and 4th toes - Patient has been on cefazolin since hospital discharge on 11/27/17  He has been tolerating antibiotics well  He was to be on po flagyl additionally which he has not been taking  He has f/u with his outpt podiatrist who is managing his wound care  Per patient, he has seen a lot of improvement in his foot  He has no issues with his PICC line  He has not had any labs drawn since his discharge  Patient is to continue IV cefazolin and start po flagyl until 1/2/17, have labs done today, and f/u in office in 2 weeks  DM II - Patient states his blood sugars have been better   He is watching his diet at home  Counseling Documentation With Imm: The patient, patient's family was counseled regarding diagnostic results, instructions for management, risk factor reductions, prognosis, patient and family education, risks and benefits of treatment options  Patient's Capacity to Self-Care: Patient is able to Self-Care  Medication SE Review and Pt Understands Tx: Possible side effects of new medications were reviewed with the patient/guardian today  The treatment plan was reviewed with the patient/guardian  The patient/guardian understands and agrees with the treatment plan      Future Appointments    Date/Time Provider Specialty Site   01/03/2018 10:15 AM Danae Fisher [facility] Infectious Disease [facility] INFECTIOUS DISEASE     Signatures   Electronically signed by : Oskar Frey [facility]; Dec 12 2017  1:43PM EST                       (Author)    Electronically signed by :  DAVIS Malin ; Dec 13 2017  8:54AM EST                       (Author)

## 2018-01-23 NOTE — RESULT NOTES
Verified Results  (1) CREATININE [id number] 05:54PM Tavon Nguyen Order Number: [id number]     Test Name Result Flag Reference   CREATININE 1 57 mg/dL H 0 60-1 30   Standardized to IDMS reference method   eGFR 58 ml/min/1 73sq m     National Kidney Disease Education Program recommendations are as follows:  GFR calculation is accurate only with a steady state creatinine  Chronic Kidney disease less than 60 ml/min/1 73 sq  meters  Kidney failure less than 15 ml/min/1 73 sq  meters

## 2018-06-29 ENCOUNTER — APPOINTMENT (EMERGENCY)
Dept: RADIOLOGY | Facility: HOSPITAL | Age: 53
End: 2018-06-29
Payer: COMMERCIAL

## 2018-06-29 ENCOUNTER — HOSPITAL ENCOUNTER (EMERGENCY)
Facility: HOSPITAL | Age: 53
Discharge: HOME | End: 2018-06-29
Attending: EMERGENCY MEDICINE | Admitting: EMERGENCY MEDICINE
Payer: COMMERCIAL

## 2018-06-29 VITALS
OXYGEN SATURATION: 99 % | TEMPERATURE: 97.8 F | DIASTOLIC BLOOD PRESSURE: 81 MMHG | RESPIRATION RATE: 20 BRPM | WEIGHT: 248.9 LBS | SYSTOLIC BLOOD PRESSURE: 184 MMHG | HEART RATE: 78 BPM | BODY MASS INDEX: 36.76 KG/M2

## 2018-06-29 DIAGNOSIS — J20.9 ACUTE BRONCHITIS: Primary | ICD-10-CM

## 2018-06-29 PROCEDURE — 99283 EMERGENCY DEPT VISIT LOW MDM: CPT

## 2018-06-29 PROCEDURE — 94640 AIRWAY INHALATION TREATMENT: CPT

## 2018-06-29 PROCEDURE — 71046 X-RAY EXAM CHEST 2 VIEWS: CPT

## 2018-06-29 RX ORDER — ALBUTEROL SULFATE 90 UG/1
2 AEROSOL, METERED RESPIRATORY (INHALATION) EVERY 6 HOURS PRN
Qty: 1 INHALER | Refills: 0 | Status: SHIPPED | OUTPATIENT
Start: 2018-06-29

## 2018-06-29 RX ORDER — ALBUTEROL SULFATE 2.5 MG/3ML
5 SOLUTION RESPIRATORY (INHALATION) ONCE
Status: COMPLETED | OUTPATIENT
Start: 2018-06-29 | End: 2018-06-29

## 2018-06-29 RX ORDER — DOXYCYCLINE HYCLATE 100 MG
100 TABLET ORAL 2 TIMES DAILY
Qty: 14 TABLET | Refills: 0 | Status: SHIPPED | OUTPATIENT
Start: 2018-06-29 | End: 2018-07-06

## 2018-06-29 RX ORDER — BENZONATATE 200 MG/1
200 CAPSULE ORAL 3 TIMES DAILY PRN
Qty: 21 CAPSULE | Refills: 0 | Status: SHIPPED | OUTPATIENT
Start: 2018-06-29 | End: 2018-07-06

## 2018-06-29 RX ADMIN — ALBUTEROL SULFATE 5 MG: 2.5 SOLUTION RESPIRATORY (INHALATION) at 21:36

## 2018-06-29 RX ADMIN — IPRATROPIUM BROMIDE 0.5 MG: 0.5 SOLUTION RESPIRATORY (INHALATION) at 21:36

## 2018-06-30 NOTE — ED PROVIDER NOTES
History  Chief Complaint   Patient presents with    Cough     Patient reports cough with chest and nasal congestion  Patient reports symptoms started after cutting grass  55-year-old male here for chest congestion  Says he has been having increasing sinus pressure over the past 2-3 days and today he went out to mow his lawn and began having a postnasal drip and new cough  He feels his chest is congested  He is worried he has pneumonia  Denies fevers or chills no nausea vomiting  Does not feel short of breath  No diaphoresis  No abdominal pain  No sick contacts  History provided by:  Patient   used: No    Cough   Cough characteristics:  Productive  Sputum characteristics:  Nondescript  Severity:  Mild  Onset quality:  Gradual  Duration:  1 day  Timing:  Sporadic  Progression:  Unchanged  Chronicity:  New  Smoker: no    Context: upper respiratory infection    Context: not animal exposure, not exposure to allergens, not fumes, not occupational exposure, not sick contacts, not smoke exposure, not weather changes and not with activity    Relieved by:  Nothing  Worsened by:  Nothing  Ineffective treatments:  None tried  Associated symptoms: sinus congestion    Associated symptoms: no chest pain, no chills, no diaphoresis, no ear fullness, no ear pain, no eye discharge, no fever, no headaches, no myalgias, no rash, no rhinorrhea, no shortness of breath, no sore throat, no weight loss and no wheezing    Risk factors: no chemical exposure, no recent infection and no recent travel        Prior to Admission Medications   Prescriptions Last Dose Informant Patient Reported? Taking?    Empagliflozin (JARDIANCE PO)   Yes No   Sig: Take by mouth daily   Exenatide (BYDUREON SC)   Yes No   Sig: Inject under the skin once a week   Insulin Glargine 300 UNIT/ML SOPN   Yes No   Sig: Inject 30 Units under the skin daily   metoprolol tartrate (LOPRESSOR) 50 mg tablet   No No   Sig: Take 1 tablet by mouth every 12 (twelve) hours      Facility-Administered Medications: None       Past Medical History:   Diagnosis Date    Diabetes mellitus ([facility] [address] )     GERD (gastroesophageal reflux disease)     Hyperlipidemia     Hypertension     Neuropathy        History reviewed  No pertinent surgical history  Family History   Problem Relation Age of Onset    Hypertension Mother     Diabetes Father     Diabetes Maternal Grandmother      I have reviewed and agree with the history as documented  Social History   Substance Use Topics    Smoking status: Never Smoker    Smokeless tobacco: Never Used    Alcohol use No        Review of Systems   Constitutional: Negative for activity change, appetite change, chills, diaphoresis, fatigue, fever, unexpected weight change and weight loss  HENT: Negative for congestion, ear pain, rhinorrhea, sinus pressure, sore throat and trouble swallowing  Eyes: Negative for photophobia, discharge and visual disturbance  Respiratory: Positive for cough  Negative for apnea, choking, chest tightness, shortness of breath, wheezing and stridor  Cardiovascular: Negative for chest pain, palpitations and leg swelling  Gastrointestinal: Negative for abdominal distention, abdominal pain, blood in stool, constipation, diarrhea, nausea and vomiting  Genitourinary: Negative for decreased urine volume, difficulty urinating, dysuria, enuresis, flank pain, frequency, hematuria and urgency  Musculoskeletal: Negative for arthralgias, myalgias, neck pain and neck stiffness  Skin: Negative for color change, pallor, rash and wound  Allergic/Immunologic: Negative  Neurological: Negative for dizziness, tremors, syncope, weakness, light-headedness, numbness and headaches  Hematological: Negative  Psychiatric/Behavioral: Negative  All other systems reviewed and are negative  Physical Exam  Physical Exam   Constitutional: He is oriented to person, place, and time   He appears well-developed and well-nourished  Non-toxic appearance  He does not have a sickly appearance  He does not appear ill  No distress  HENT:   Head: Normocephalic and atraumatic  Right Ear: Hearing, tympanic membrane, external ear and ear canal normal    Left Ear: Hearing, tympanic membrane, external ear and ear canal normal    Nose: Nose normal    Mouth/Throat: Uvula is midline, oropharynx is clear and moist and mucous membranes are normal    Eyes: EOM and lids are normal  Pupils are equal, round, and reactive to light  Neck: Normal range of motion  Neck supple  Cardiovascular: Normal rate, regular rhythm, S1 normal, S2 normal, normal heart sounds, intact distal pulses and normal pulses  Exam reveals no gallop, no distant heart sounds, no friction rub and no decreased pulses  No murmur heard  Pulses:       Radial pulses are 2+ on the right side, and 2+ on the left side  Pulmonary/Chest: Effort normal and breath sounds normal  No accessory muscle usage  No apnea, no tachypnea and no bradypnea  No respiratory distress  He has no decreased breath sounds  He has no wheezes  He has no rhonchi  He has no rales  Transmitted upper airway sounds  Abdominal: Soft  Normal appearance and bowel sounds are normal  He exhibits no distension and no mass  There is no tenderness  There is no rigidity, no rebound and no guarding  No hernia  Musculoskeletal: Normal range of motion  He exhibits no edema, tenderness or deformity  Neurological: He is alert and oriented to person, place, and time  No cranial nerve deficit  GCS eye subscore is 4  GCS verbal subscore is 5  GCS motor subscore is 6  GCS 15  AAOx3  Ambulating in department without difficulty  CN II-XII grossly intact  No focal neuro deficits  Skin: Skin is warm, dry and intact  No rash noted  He is not diaphoretic  No erythema  No pallor  Psychiatric: His speech is normal    Nursing note and vitals reviewed        Vital Signs  ED Triage Vitals [06/29/18 2118]   Temperature Pulse Respirations Blood Pressure SpO2   97 8 °F (36 6 °C) 70 18 (!) 188/88 98 %      Temp src Heart Rate Source Patient Position - Orthostatic VS BP Location FiO2 (%)   -- Monitor Sitting Right arm --      Pain Score       No Pain           Vitals:    06/29/18 2118 06/29/18 2145   BP: (!) 188/88    Pulse: 70 71   Patient Position - Orthostatic VS: Sitting        Visual Acuity      ED Medications  Medications   albuterol inhalation solution 5 mg (5 mg Nebulization Given 6/29/18 2136)   ipratropium (ATROVENT) 0 02 % inhalation solution 0 5 mg (0 5 mg Nebulization Given 6/29/18 2136)       Diagnostic Studies  Results Reviewed     None                 XR chest 2 views   ED Interpretation by Sonido Ward PA-C (06/29 2236)   No acute pulmonary abnormalities  Procedures  Procedures       Phone Contacts  ED Phone Contact    ED Course                               MDM  Number of Diagnoses or Management Options  Acute bronchitis: new and requires workup  Diagnosis management comments: DDx including but not limited to:  URI, bronchitis, pneumonia, GERD, aspiration pneumonitis, viral illness, smoke inhalation, allergy  Plan: CXR  Breathing tx  Amount and/or Complexity of Data Reviewed  Tests in the radiology section of CPT®: ordered and reviewed  Independent visualization of images, tracings, or specimens: yes    Risk of Complications, Morbidity, and/or Mortality  Presenting problems: low  Management options: low  General comments: 48year-old with cough and chest congestion  X-ray with no acute pulmonary abnormalities  This could be acute bronchitis  Given his concern for pneumonia as symptoms are similar to previous the x-ray could be lagging behind clinical picture  Will prescribe antibiotics  Inhaler  for his bronchitis  Tessalon for cough  Follow up with PCP  Return for worsening symptoms  Patient understands and agrees with this plan      Patient Progress  Patient progress: stable    CritCare Time    Disposition  Final diagnoses:   Acute bronchitis     Time reflects when diagnosis was documented in both MDM as applicable and the Disposition within this note     Time User Action Codes Description Comment    6/29/2018 10:37 PM Ama SALAS Ronaldo [J20 [id number]] Acute bronchitis       ED Disposition     ED Disposition Condition Comment    Discharge  [facility] discharge to home/self care  Condition at discharge: Good        Follow-up Information     Follow up With Specialties Details Why Contact Info    Buffy Srinivasan MD [facility] Medicine Call for follow up appointment 1 [address]  [phone]            Patient's Medications   Discharge Prescriptions    ALBUTEROL (PROVENTIL HFA,VENTOLIN HFA) 90 MCG/ACT INHALER    Inhale 2 puffs every 6 (six) hours as needed for wheezing or shortness of breath       Start Date: 6/29/2018 End Date: --       Order Dose: 2 puffs       Quantity: 1 Inhaler    Refills: 0    BENZONATATE (TESSALON) 200 MG CAPSULE    Take 1 capsule (200 mg total) by mouth 3 (three) times a day as needed for cough for up to 7 days       Start Date: 6/29/2018 End Date: 7/6/2018       Order Dose: 200 mg       Quantity: 21 capsule    Refills: 0    DOXYCYCLINE HYCLATE (VIBRA-TABS) 100 MG TABLET    Take 1 tablet (100 mg total) by mouth 2 (two) times a day for 7 days       Start Date: 6/29/2018 End Date: 7/6/2018       Order Dose: 100 mg       Quantity: 14 tablet    Refills: 0     No discharge procedures on file      ED Provider  Electronically Signed by           Shelby Brown PA-C  06/29/18 2189

## 2018-06-30 NOTE — DISCHARGE INSTRUCTIONS
Return sooner to the Emergency Department if increased shortness of breath, fever, pain, vomiting, bleeding, weakness, dizziness  Acute Bronchitis   WHAT YOU NEED TO KNOW:   What is acute bronchitis? Acute bronchitis is swelling and irritation in the air passages of your lungs  This irritation may cause you to cough or have other breathing problems  Acute bronchitis often starts because of another illness, such as a cold or the flu  The illness spreads from your nose and throat to your windpipe and airways  Bronchitis is often called a chest cold  Acute bronchitis lasts about 3 to 6 weeks and is usually not a serious illness  What causes acute bronchitis? · Infection  caused by a virus, bacteria, or a fungus    · Polluted air  caused by chemical fumes, dust, smoke, allergens, or pollution  What increases my risk for acute bronchitis? · Age, usually older adults    · Smoking cigarettes or being around cigarette smoke    · Chronic lung diseases or chronic sinus infections    · Weakened immune system    · Gastroesophageal reflux disease    · Allergies and environmental changes  What are the signs and symptoms of acute bronchitis? · A cough with sputum that may be clear, yellow, or green    · Feeling more tired than usual, and body aches    · A fever and chills    · Wheezing when you breathe    · A tight chest or pain when you breathe or cough  How is acute bronchitis diagnosed? Your healthcare provider may diagnose bronchitis by your symptoms  If he is not sure, you may need the following:  · Blood tests  will be done to see if your symptoms are caused by an infection  · X-ray  pictures of your lungs and heart may show signs of infection, such as pneumonia  Chest x-rays may also show fluid around your heart and lungs  How is acute bronchitis treated? Your healthcare provider will treat any condition that has caused your acute bronchitis   He may also give you any of the following:  · Ibuprofen or acetaminophen  are medicines that help lower your fever  They are available without a doctor's order  Ask your healthcare provider which medicine is right for you  Ask how much to take and how often to take it  Follow directions  These medicines can cause stomach bleeding if not taken correctly  Ibuprofen can cause kidney damage  Do not take ibuprofen if you have kidney disease, an ulcer, or allergies to aspirin  Acetaminophen can cause liver damage  Do not take more than 4,000 milligrams in 24 hours  · Decongestants  help loosen mucus in your lungs and make it easier to cough up  This can help you breathe easier  · Cough suppressants  decrease your urge to cough  If your cough produces mucus, do not take a cough suppressant unless your healthcare provider tells you to  Your healthcare provider may suggest that you take a cough suppressant at night so you can rest     · Inhalers  may be given  Your healthcare provider may give you one or more inhalers to help you breathe easier and cough less  An inhaler gives your medicine to open your airways  Ask your healthcare provider to show you how to use your inhaler correctly  How can I care for myself when I have acute bronchitis? · Get more rest   Rest helps your body to heal  Slowly start to do more each day  Rest when you feel it is needed  · Avoid irritants in the air  Avoid chemicals, fumes, and dust  Wear a face mask if you must work around dust or fumes  Stay inside on days when air pollution levels are high  If you have allergies, stay inside when pollen counts are high  Do not use aerosol products, such as spray-on deodorant, bug spray, and hair spray  · Do not smoke or be around others who smoke  Nicotine and other chemicals in cigarettes and cigars damages the cilia that move mucus out of your lungs  Ask your healthcare provider for information if you currently smoke and need help to quit   E-cigarettes or smokeless tobacco still contain nicotine  Talk to your healthcare provider before you use these products  · Drink liquids as directed  Liquids help keep your air passages moist and help you cough up mucus  You may need to drink more liquids when you have acute bronchitis  Ask how much liquid to drink each day and which liquids are best for you  · Use a humidifier or vaporizer  Use a cool mist humidifier or a vaporizer to increase air moisture in your home  This may make it easier for you to breathe and help decrease your cough  How can I decrease my risk for acute bronchitis? · Get the vaccinations you need  Ask your healthcare provider if you should get vaccinated against the flu or pneumonia  · Prevent the spread of germs  You can decrease your risk of acute bronchitis and other illnesses by doing the following:     [facility] your hands often with soap and water  Carry germ-killing hand lotion or gel with you  You can use the lotion or gel to clean your hands when soap and water are not available  ¨ Do not touch your eyes, nose, or mouth unless you have washed your hands first     ¨ Always cover your mouth when you cough to prevent the spread of germs  It is best to cough into a tissue or your shirt sleeve instead of into your hand  Ask those around you cover their mouths when they cough  ¨ Try to avoid people who have a cold or the flu  If you are sick, stay away from others as much as possible  When should I seek immediate care? · You cough up blood  · Your lips or fingernails turn blue  · You feel like you are not getting enough air when you breathe  When should I contact my healthcare provider? · You have a fever  · Your breathing problems do not go away or get worse  · Your cough does not get better within 4 weeks  · You have questions or concerns about your condition or care  CARE AGREEMENT:   You have the right to help plan your care  Learn about your health condition and how it may be treated  Discuss treatment options with your caregivers to decide what care you want to receive  You always have the right to refuse treatment  The above information is an  only  It is not intended as medical advice for individual conditions or treatments  Talk to your doctor, nurse or pharmacist before following any medical regimen to see if it is safe and effective for you  © 2017 [address] [employer] Information is for End User's use only and may not be sold, redistributed or otherwise used for commercial purposes  All illustrations and images included in CareNotes® are the copyrighted property of [employer]  or Fran Garcia

## 2021-04-23 ENCOUNTER — APPOINTMENT (EMERGENCY)
Dept: CT IMAGING | Facility: HOSPITAL | Age: 56
End: 2021-04-23
Payer: COMMERCIAL

## 2021-04-23 ENCOUNTER — HOSPITAL ENCOUNTER (EMERGENCY)
Facility: HOSPITAL | Age: 56
Discharge: HOME | End: 2021-04-24
Attending: EMERGENCY MEDICINE
Payer: COMMERCIAL

## 2021-04-23 VITALS
RESPIRATION RATE: 13 BRPM | SYSTOLIC BLOOD PRESSURE: 135 MMHG | TEMPERATURE: 99.6 F | DIASTOLIC BLOOD PRESSURE: 71 MMHG | HEART RATE: 78 BPM | OXYGEN SATURATION: 96 %

## 2021-04-23 DIAGNOSIS — L97.509 DIABETIC FOOT ULCERS (HCC): ICD-10-CM

## 2021-04-23 DIAGNOSIS — L03.90 CELLULITIS: Primary | ICD-10-CM

## 2021-04-23 DIAGNOSIS — E11.621 DIABETIC FOOT ULCERS (HCC): ICD-10-CM

## 2021-04-23 LAB
ALBUMIN SERPL BCP-MCNC: 3.4 G/DL (ref 3.5–5)
ALP SERPL-CCNC: 88 U/L (ref 46–116)
ALT SERPL W P-5'-P-CCNC: 23 U/L (ref 12–78)
ANION GAP SERPL CALCULATED.3IONS-SCNC: 10 MMOL/L (ref 4–13)
AST SERPL W P-5'-P-CCNC: 19 U/L (ref 5–45)
BASOPHILS # BLD AUTO: 0.05 THOUSANDS/ΜL (ref 0–0.1)
BASOPHILS NFR BLD AUTO: 1 % (ref 0–1)
BILIRUB DIRECT SERPL-MCNC: 0.09 MG/DL (ref 0–0.2)
BILIRUB SERPL-MCNC: 0.54 MG/DL (ref 0.2–1)
BUN SERPL-MCNC: 33 MG/DL (ref 5–25)
CALCIUM SERPL-MCNC: 8.2 MG/DL (ref 8.3–10.1)
CHLORIDE SERPL-SCNC: 102 MMOL/L (ref 100–108)
CO2 SERPL-SCNC: 25 MMOL/L (ref 21–32)
CREAT SERPL-MCNC: 1.88 MG/DL (ref 0.6–1.3)
EOSINOPHIL # BLD AUTO: 0.43 THOUSAND/ΜL (ref 0–0.61)
EOSINOPHIL NFR BLD AUTO: 4 % (ref 0–6)
ERYTHROCYTE [DISTWIDTH] IN BLOOD BY AUTOMATED COUNT: 14.1 % (ref 11.6–15.1)
GFR SERPL CREATININE-BSD FRML MDRD: 39 ML/MIN/1.73SQ M
GLUCOSE SERPL-MCNC: 218 MG/DL (ref 65–140)
HCT VFR BLD AUTO: 41.6 % (ref 36.5–49.3)
HGB BLD-MCNC: 13.3 G/DL (ref 12–17)
IMM GRANULOCYTES # BLD AUTO: 0.03 THOUSAND/UL (ref 0–0.2)
IMM GRANULOCYTES NFR BLD AUTO: 0 % (ref 0–2)
LYMPHOCYTES # BLD AUTO: 1.74 THOUSANDS/ΜL (ref 0.6–4.47)
LYMPHOCYTES NFR BLD AUTO: 17 % (ref 14–44)
MCH RBC QN AUTO: 28.1 PG (ref 26.8–34.3)
MCHC RBC AUTO-ENTMCNC: 32 G/DL (ref 31.4–37.4)
MCV RBC AUTO: 88 FL (ref 82–98)
MONOCYTES # BLD AUTO: 0.69 THOUSAND/ΜL (ref 0.17–1.22)
MONOCYTES NFR BLD AUTO: 7 % (ref 4–12)
NEUTROPHILS # BLD AUTO: 7.61 THOUSANDS/ΜL (ref 1.85–7.62)
NEUTS SEG NFR BLD AUTO: 71 % (ref 43–75)
NRBC BLD AUTO-RTO: 0 /100 WBCS
PLATELET # BLD AUTO: 169 THOUSANDS/UL (ref 149–390)
PMV BLD AUTO: 10.5 FL (ref 8.9–12.7)
POTASSIUM SERPL-SCNC: 4.2 MMOL/L (ref 3.5–5.3)
PROT SERPL-MCNC: 7.5 G/DL (ref 6.4–8.2)
RBC # BLD AUTO: 4.74 MILLION/UL (ref 3.88–5.62)
SODIUM SERPL-SCNC: 137 MMOL/L (ref 136–145)
WBC # BLD AUTO: 10.55 THOUSAND/UL (ref 4.31–10.16)

## 2021-04-23 PROCEDURE — 85025 COMPLETE CBC W/AUTO DIFF WBC: CPT | Performed by: EMERGENCY MEDICINE

## 2021-04-23 PROCEDURE — 73701 CT LOWER EXTREMITY W/DYE: CPT

## 2021-04-23 PROCEDURE — 36415 COLL VENOUS BLD VENIPUNCTURE: CPT | Performed by: EMERGENCY MEDICINE

## 2021-04-23 PROCEDURE — 99284 EMERGENCY DEPT VISIT MOD MDM: CPT | Performed by: EMERGENCY MEDICINE

## 2021-04-23 PROCEDURE — 93005 ELECTROCARDIOGRAM TRACING: CPT

## 2021-04-23 PROCEDURE — 99284 EMERGENCY DEPT VISIT MOD MDM: CPT

## 2021-04-23 PROCEDURE — 80076 HEPATIC FUNCTION PANEL: CPT | Performed by: EMERGENCY MEDICINE

## 2021-04-23 PROCEDURE — 80048 BASIC METABOLIC PNL TOTAL CA: CPT | Performed by: EMERGENCY MEDICINE

## 2021-04-23 RX ADMIN — IOHEXOL 100 ML: 350 INJECTION, SOLUTION INTRAVENOUS at 22:35

## 2021-04-23 NOTE — Clinical Note
Case was discussed with Dr Leonid Torres and the patient's admission status was agreed to be Admission Status: inpatient status to the service of Dr Leonid Torres

## 2021-04-24 LAB
ATRIAL RATE: 80 BPM
P AXIS: 49 DEGREES
PR INTERVAL: 176 MS
QRS AXIS: 70 DEGREES
QRSD INTERVAL: 88 MS
QT INTERVAL: 368 MS
QTC INTERVAL: 424 MS
T WAVE AXIS: 124 DEGREES
VENTRICULAR RATE: 80 BPM

## 2021-04-24 PROCEDURE — 93010 ELECTROCARDIOGRAM REPORT: CPT | Performed by: INTERNAL MEDICINE

## 2021-04-24 RX ORDER — VANCOMYCIN HYDROCHLORIDE 125 MG/1
125 CAPSULE ORAL 4 TIMES DAILY
Qty: 40 CAPSULE | Refills: 0 | Status: SHIPPED | OUTPATIENT
Start: 2021-04-24 | End: 2021-05-04

## 2021-04-24 RX ORDER — CLINDAMYCIN HYDROCHLORIDE 300 MG/1
300 CAPSULE ORAL 4 TIMES DAILY
Qty: 40 CAPSULE | Refills: 0 | Status: SHIPPED | OUTPATIENT
Start: 2021-04-24 | End: 2021-05-04

## 2021-04-24 NOTE — ED PROVIDER NOTES
History  Chief Complaint   Patient presents with    Foot Ulcer     PT states hx of DM with a blister that opened on L foot  Pain in medial calf  54year old male patient presents emergency department for evaluation of left foot and ankle pain  The patient has diabetic neuropathy, he is unable to feel his feet for the most part but started sensing pain left medial aspect of his ankle consistent with previous infections  He does have an ulcer developing on the sole of his foot which is not new or worsening according to family at bedside  He will be evaluated for possible diabetic foot ulcer abscess, infection, osteomyelitis as part his differential diagnosis  History provided by:  Patient   used: No    Medical Problem  Severity:  Mild  Onset quality:  Gradual  Timing:  Constant  Progression:  Worsening  Chronicity:  New  Associated symptoms: no congestion, no diarrhea, no headaches and no shortness of breath        Prior to Admission Medications   Prescriptions Last Dose Informant Patient Reported? Taking? Empagliflozin (JARDIANCE PO)   Yes No   Sig: Take by mouth daily   Exenatide (BYDUREON SC)   Yes No   Sig: Inject under the skin once a week   Insulin Glargine 300 UNIT/ML SOPN   Yes No   Sig: Inject 30 Units under the skin daily   albuterol (PROVENTIL HFA,VENTOLIN HFA) 90 mcg/act inhaler   No No   Sig: Inhale 2 puffs every 6 (six) hours as needed for wheezing or shortness of breath   metoprolol tartrate (LOPRESSOR) 50 mg tablet   No No   Sig: Take 1 tablet by mouth every 12 (twelve) hours      Facility-Administered Medications: None       Past Medical History:   Diagnosis Date    Diabetes mellitus ([facility] [address] )     GERD (gastroesophageal reflux disease)     Hyperlipidemia     Hypertension     Neuropathy        History reviewed  No pertinent surgical history      Family History   Problem Relation Age of Onset    Hypertension Mother     Diabetes Father     Diabetes Maternal Grandmother      I have reviewed and agree with the history as documented  E-Cigarette/Vaping     E-Cigarette/Vaping Substances     Social History     Tobacco Use    Smoking status: Never Smoker    Smokeless tobacco: Never Used   Substance Use Topics    Alcohol use: No    Drug use: No       Review of Systems   HENT: Negative for congestion  Respiratory: Negative for shortness of breath  Gastrointestinal: Negative for diarrhea  Neurological: Negative for headaches  All other systems reviewed and are negative  Physical Exam  Physical Exam  Vitals signs and nursing note reviewed  Constitutional:       General: He is not in acute distress  Appearance: He is well-developed  He is not diaphoretic  HENT:      Head: Normocephalic and atraumatic  Right Ear: External ear normal       Left Ear: External ear normal    Eyes:      General: No scleral icterus  Right eye: No discharge  Left eye: No discharge  Conjunctiva/sclera: Conjunctivae normal    Neck:      Musculoskeletal: Normal range of motion and neck supple  Thyroid: No thyromegaly  Vascular: No JVD  Trachea: No tracheal deviation  Cardiovascular:      Rate and Rhythm: Normal rate and regular rhythm  Pulmonary:      Effort: Pulmonary effort is normal  No respiratory distress  Breath sounds: Normal breath sounds  No stridor  No wheezing or rales  Abdominal:      General: Bowel sounds are normal  There is no distension  Palpations: Abdomen is soft  Tenderness: There is no abdominal tenderness  Musculoskeletal: Normal range of motion  General: No tenderness or deformity  Feet:    Skin:     General: Skin is warm and dry  Neurological:      Mental Status: He is alert and oriented to person, place, and time  Cranial Nerves: No cranial nerve deficit        Coordination: Coordination normal    Psychiatric:         Behavior: Behavior normal          Vital Signs  ED Triage Vitals [04/23/21 2000]   Temperature Pulse Respirations Blood Pressure SpO2   99 6 °F (37 6 °C) 91 20 170/81 97 %      Temp Source Heart Rate Source Patient Position - Orthostatic VS BP Location FiO2 (%)   Oral Monitor -- Left arm --      Pain Score       --           Vitals:    04/23/21 2000 04/23/21 2200   BP: 170/81 135/71   Pulse: 91 78         Visual Acuity      ED Medications  Medications   iohexol (OMNIPAQUE) 350 MG/ML injection (SINGLE-DOSE) 100 mL (100 mL Intravenous Given 4/23/21 2235)       Diagnostic Studies  Results Reviewed     Procedure Component Value Units Date/Time    Hepatic function panel [[id number]]  (Abnormal) Collected: 04/23/21 2156    Lab Status: Final result Specimen: Blood from Arm, Right Updated: 04/23/21 2217     Total Bilirubin 0 54 mg/dL      Bilirubin, Direct 0 09 mg/dL      Alkaline Phosphatase 88 U/L      AST 19 U/L      ALT 23 U/L      Total Protein 7 5 g/dL      Albumin 3 4 g/dL     Basic metabolic panel [[id number]]  (Abnormal) Collected: 04/23/21 2156    Lab Status: Final result Specimen: Blood from Arm, Right Updated: 04/23/21 2217     Sodium 137 mmol/L      Potassium 4 2 mmol/L      Chloride 102 mmol/L      CO2 25 mmol/L      ANION GAP 10 mmol/L      BUN 33 mg/dL      Creatinine 1 88 mg/dL      Glucose 218 mg/dL      Calcium 8 2 mg/dL      eGFR 39 ml/min/1 73sq m     Narrative:      Paulino guidelines for Chronic Kidney Disease (CKD):     Stage 1 with normal or high GFR (GFR > 90 mL/min/1 73 square meters)    Stage 2 Mild CKD (GFR = 60-89 mL/min/1 73 square meters)    Stage 3A Moderate CKD (GFR = 45-59 mL/min/1 73 square meters)    Stage 3B Moderate CKD (GFR = 30-44 mL/min/1 73 square meters)    Stage 4 Severe CKD (GFR = 15-29 mL/min/1 73 square meters)    Stage 5 End Stage CKD (GFR <15 mL/min/1 73 square meters)  Note: GFR calculation is accurate only with a steady state creatinine    CBC and differential [[id number]]  (Abnormal) Collected: 04/23/21 2156    Lab Status: Final result Specimen: Blood from Arm, Right Updated: 04/23/21 2202     WBC 10 55 Thousand/uL      RBC 4 74 Million/uL      Hemoglobin 13 3 g/dL      Hematocrit 41 6 %      MCV 88 fL      MCH 28 1 pg      MCHC 32 0 g/dL      RDW 14 1 %      MPV 10 5 fL      Platelets 904 Thousands/uL      nRBC 0 /100 WBCs      Neutrophils Relative 71 %      Immat GRANS % 0 %      Lymphocytes Relative 17 %      Monocytes Relative 7 %      Eosinophils Relative 4 %      Basophils Relative 1 %      Neutrophils Absolute 7 61 Thousands/µL      Immature Grans Absolute 0 03 Thousand/uL      Lymphocytes Absolute 1 74 Thousands/µL      Monocytes Absolute 0 69 Thousand/µL      Eosinophils Absolute 0 43 Thousand/µL      Basophils Absolute 0 05 Thousands/µL                  CT lower extremity w contrast left   Final Result by Barbara Esposito DO (04/23 2350)      Rim-enhancing heterogeneous collection of fluid and air in the superficial soft tissues of the plantar midfoot which measures approximately 3 2 x 0 9 x 2 7 cm in AP, CC, and transverse dimensions, this correlates with patient's history of ulcer with    associated subcutaneous infection/abscess  Subcutaneous edema is noted in the leg, ankle, and foot, especially the plantar aspect  Superimposed cellulitis could be considered in the appropriate clinical setting  Vascular calcifications are noted, correlates with patient's history of diabetes mellitus  Old fractures and degenerative changes of the midfoot, as described, probably sequela of Charcot arthropathy given the patient's history  Other findings as above  Workstation performed: [id number]                    Procedures  Procedures         ED Course                             SBIRT 20yo+      Most Recent Value   SBIRT (25 yo +)   In order to provide better care to our patients, we are screening all of our patients for alcohol and drug use   Would it be okay to ask you these screening questions? Yes Filed at: 04/23/2021 2125   Initial Alcohol Screen: US AUDIT-C    1  How often do you have a drink containing alcohol?  0 Filed at: 04/23/2021 2125   2  How many drinks containing alcohol do you have on a typical day you are drinking? 0 Filed at: 04/23/2021 2125   3a  Male UNDER 65: How often do you have five or more drinks on one occasion? 0 Filed at: 04/23/2021 2125   3b  FEMALE Any Age, or MALE 65+: How often do you have 4 or more drinks on one occassion? 0 Filed at: 04/23/2021 2125   Audit-C Score  0 Filed at: 04/23/2021 2125   DEMOND: How many times in the past year have you    Used an illegal drug or used a prescription medication for non-medical reasons?   Never Filed at: 04/23/2021 2125                    MDM  Number of Diagnoses or Management Options  Cellulitis: new and requires workup  Diabetic foot ulcers ([facility] [address] ): new and requires workup     Amount and/or Complexity of Data Reviewed  Clinical lab tests: ordered and reviewed  Decide to obtain previous medical records or to obtain history from someone other than the patient: yes  Review and summarize past medical records: yes    Patient Progress  Patient progress: stable      Disposition  Final diagnoses:   Cellulitis   Diabetic foot ulcers ([address] )     Time reflects when diagnosis was documented in both MDM as applicable and the Disposition within this note     Time User Action Codes Description Comment    4/24/2021 12:11 AM Rosi Newman Add [L03 90] Cellulitis     4/24/2021 12:12 AM Juvenal Menchaca Add [R11 451,  L97 509] Diabetic foot ulcers ([facility] [address] )     4/24/2021 12:12 AM Juvenal Menchaca Modify [A82 59] Cellulitis       ED Disposition     ED Disposition Condition Date/Time Comment    [facility] Apr 24, 2021 12:23 AM         Follow-up Information     Follow up With Specialties Details Why [address] Sweta Campbell DPM Podiatry   [address] [facility]  [phone]            Discharge Medication List as of 4/24/2021 12:24 AM      START taking these medications    Details   clindamycin (CLEOCIN) 300 MG capsule Take 1 capsule (300 mg total) by mouth 4 (four) times a day for 10 days, Starting Sat 4/24/2021, Until Tue 5/4/2021, Print      vancomycin (VANCOCIN) 125 MG capsule Take 1 capsule (125 mg total) by mouth 4 (four) times a day for 10 days, Starting Sat 4/24/2021, Until Tue 5/4/2021, Print         CONTINUE these medications which have NOT CHANGED    Details   albuterol (PROVENTIL HFA,VENTOLIN HFA) 90 mcg/act inhaler Inhale 2 puffs every 6 (six) hours as needed for wheezing or shortness of breath, Starting Fri 6/29/2018, Print      Empagliflozin (JARDIANCE PO) Take by mouth daily, Historical Med      Exenatide (BYDUREON SC) Inject under the skin once a week, Historical Med      Insulin Glargine 300 UNIT/ML SOPN Inject 30 Units under the skin daily, Historical Med      metoprolol tartrate (LOPRESSOR) 50 mg tablet Take 1 tablet by mouth every 12 (twelve) hours, Starting Mon 11/27/2017, Print           No discharge procedures on file      PDMP Review     None          ED Provider  Electronically Signed by           Harley Travis DO  04/26/21 5200

## 2022-10-26 ENCOUNTER — HOSPITAL ENCOUNTER (EMERGENCY)
Facility: HOSPITAL | Age: 57
Discharge: HOME | End: 2022-10-27
Attending: EMERGENCY MEDICINE
Payer: COMMERCIAL

## 2022-10-26 DIAGNOSIS — L03.90 CELLULITIS: Primary | ICD-10-CM

## 2022-10-26 DIAGNOSIS — L02.611 CELLULITIS AND ABSCESS OF TOE OF RIGHT FOOT: ICD-10-CM

## 2022-10-26 DIAGNOSIS — L03.031 CELLULITIS AND ABSCESS OF TOE OF RIGHT FOOT: ICD-10-CM

## 2022-10-26 LAB
BASOPHILS # BLD AUTO: 0.08 THOUSANDS/ÂΜL (ref 0–0.1)
BASOPHILS NFR BLD AUTO: 1 % (ref 0–1)
EOSINOPHIL # BLD AUTO: 0.61 THOUSAND/ÂΜL (ref 0–0.61)
EOSINOPHIL NFR BLD AUTO: 8 % (ref 0–6)
ERYTHROCYTE [DISTWIDTH] IN BLOOD BY AUTOMATED COUNT: 14.3 % (ref 11.6–15.1)
HCT VFR BLD AUTO: 40.5 % (ref 36.5–49.3)
HGB BLD-MCNC: 12.7 G/DL (ref 12–17)
IMM GRANULOCYTES # BLD AUTO: 0.02 THOUSAND/UL (ref 0–0.2)
IMM GRANULOCYTES NFR BLD AUTO: 0 % (ref 0–2)
LACTATE SERPL-SCNC: 1 MMOL/L (ref 0.5–2)
LYMPHOCYTES # BLD AUTO: 2.24 THOUSANDS/ÂΜL (ref 0.6–4.47)
LYMPHOCYTES NFR BLD AUTO: 30 % (ref 14–44)
MCH RBC QN AUTO: 28.2 PG (ref 26.8–34.3)
MCHC RBC AUTO-ENTMCNC: 31.4 G/DL (ref 31.4–37.4)
MCV RBC AUTO: 90 FL (ref 82–98)
MONOCYTES # BLD AUTO: 0.67 THOUSAND/ÂΜL (ref 0.17–1.22)
MONOCYTES NFR BLD AUTO: 9 % (ref 4–12)
NEUTROPHILS # BLD AUTO: 3.89 THOUSANDS/ÂΜL (ref 1.85–7.62)
NEUTS SEG NFR BLD AUTO: 52 % (ref 43–75)
NRBC BLD AUTO-RTO: 0 /100 WBCS
PLATELET # BLD AUTO: 214 THOUSANDS/UL (ref 149–390)
PMV BLD AUTO: 10.1 FL (ref 8.9–12.7)
RBC # BLD AUTO: 4.51 MILLION/UL (ref 3.88–5.62)
WBC # BLD AUTO: 7.51 THOUSAND/UL (ref 4.31–10.16)

## 2022-10-26 PROCEDURE — 36415 COLL VENOUS BLD VENIPUNCTURE: CPT | Performed by: EMERGENCY MEDICINE

## 2022-10-26 PROCEDURE — 99284 EMERGENCY DEPT VISIT MOD MDM: CPT | Performed by: EMERGENCY MEDICINE

## 2022-10-26 PROCEDURE — 84145 PROCALCITONIN (PCT): CPT | Performed by: EMERGENCY MEDICINE

## 2022-10-26 PROCEDURE — 87040 BLOOD CULTURE FOR BACTERIA: CPT | Performed by: EMERGENCY MEDICINE

## 2022-10-26 PROCEDURE — 85025 COMPLETE CBC W/AUTO DIFF WBC: CPT | Performed by: EMERGENCY MEDICINE

## 2022-10-26 PROCEDURE — 80053 COMPREHEN METABOLIC PANEL: CPT | Performed by: EMERGENCY MEDICINE

## 2022-10-26 PROCEDURE — 83605 ASSAY OF LACTIC ACID: CPT | Performed by: EMERGENCY MEDICINE

## 2022-10-26 RX ORDER — HYDRALAZINE HYDROCHLORIDE 20 MG/ML
10 INJECTION INTRAMUSCULAR; INTRAVENOUS ONCE
Status: COMPLETED | OUTPATIENT
Start: 2022-10-26 | End: 2022-10-26

## 2022-10-26 RX ADMIN — CEFTRIAXONE 2000 MG: 2 INJECTION, POWDER, FOR SOLUTION INTRAMUSCULAR; INTRAVENOUS at 23:39

## 2022-10-26 RX ADMIN — HYDRALAZINE HYDROCHLORIDE 10 MG: 20 INJECTION INTRAMUSCULAR; INTRAVENOUS at 23:39

## 2022-10-27 VITALS
RESPIRATION RATE: 18 BRPM | HEIGHT: 69 IN | OXYGEN SATURATION: 98 % | TEMPERATURE: 98 F | SYSTOLIC BLOOD PRESSURE: 147 MMHG | BODY MASS INDEX: 34.22 KG/M2 | HEART RATE: 81 BPM | DIASTOLIC BLOOD PRESSURE: 71 MMHG | WEIGHT: 231.04 LBS

## 2022-10-27 LAB
ALBUMIN SERPL BCP-MCNC: 3.5 G/DL (ref 3.5–5)
ALP SERPL-CCNC: 91 U/L (ref 46–116)
ALT SERPL W P-5'-P-CCNC: 20 U/L (ref 12–78)
ANION GAP SERPL CALCULATED.3IONS-SCNC: 9 MMOL/L (ref 4–13)
AST SERPL W P-5'-P-CCNC: 19 U/L (ref 5–45)
BILIRUB SERPL-MCNC: 0.37 MG/DL (ref 0.2–1)
BUN SERPL-MCNC: 32 MG/DL (ref 5–25)
CALCIUM SERPL-MCNC: 8.6 MG/DL (ref 8.3–10.1)
CHLORIDE SERPL-SCNC: 102 MMOL/L (ref 96–108)
CO2 SERPL-SCNC: 26 MMOL/L (ref 21–32)
CREAT SERPL-MCNC: 1.87 MG/DL (ref 0.6–1.3)
GFR SERPL CREATININE-BSD FRML MDRD: 39 ML/MIN/1.73SQ M
GLUCOSE SERPL-MCNC: 269 MG/DL (ref 65–140)
POTASSIUM SERPL-SCNC: 4.3 MMOL/L (ref 3.5–5.3)
PROCALCITONIN SERPL-MCNC: 0.1 NG/ML
PROT SERPL-MCNC: 8.2 G/DL (ref 6.4–8.4)
SODIUM SERPL-SCNC: 137 MMOL/L (ref 135–147)

## 2022-10-27 RX ORDER — CEPHALEXIN 500 MG/1
500 CAPSULE ORAL EVERY 6 HOURS SCHEDULED
Qty: 40 CAPSULE | Refills: 0 | Status: SHIPPED | OUTPATIENT
Start: 2022-10-27 | End: 2022-11-06

## 2022-10-27 NOTE — DISCHARGE INSTRUCTIONS
A  personal message from Dr Colette Danielle,  Thank you so much for allowing me to care for you today  I pride myself in the care and attention I give all my patients  I hope you were a witness to this tonight  If for any reason your condition does not improve, worsens, or you have a question that was not answered during your visit you can feel free to text me on my personal phone   # [phone]  I will answer to your message and continue your care past your emergency room visit

## 2022-10-27 NOTE — ED PROVIDER NOTES
History  Chief Complaint   Patient presents with   • Leg Pain     Pt arrived ambulatory with c/o chills and redness of leg  Pt currently on abx  Hx diabetes       This is a 26-year-old  male who presents emergency department with a few days onset of left lower extremity pain, swelling, discoloration  It is warm to touch it has a purpuric rash that does not marychuy to palpation  He was seen by the primary care doctor who started him on Bactrim  But the area seems to be getting worse  He is very concerned because he is a diabetic and he had a similar infection in the right lower extremity and 1 point the threatened to do an amputation  He has a chronic left foot lesion on the sole of the midfoot  That does not look actively infected, there is no drainage  They do daily dressing changes  History provided by:  Patient and spouse   used: No    Leg Pain  Associated symptoms: no back pain and no fever        Prior to Admission Medications   Prescriptions Last Dose Informant Patient Reported? Taking? Empagliflozin (JARDIANCE PO)   Yes No   Sig: Take by mouth daily   Exenatide (BYDUREON SC)   Yes No   Sig: Inject under the skin once a week   Insulin Glargine 300 UNIT/ML SOPN   Yes No   Sig: Inject 30 Units under the skin daily   albuterol (PROVENTIL HFA,VENTOLIN HFA) 90 mcg/act inhaler   No No   Sig: Inhale 2 puffs every 6 (six) hours as needed for wheezing or shortness of breath   metoprolol tartrate (LOPRESSOR) 50 mg tablet   No No   Sig: Take 1 tablet by mouth every 12 (twelve) hours      Facility-Administered Medications: None       Past Medical History:   Diagnosis Date   • Diabetes mellitus ([facility] [address] )    • GERD (gastroesophageal reflux disease)    • Hyperlipidemia    • Hypertension    • Neuropathy        History reviewed  No pertinent surgical history      Family History   Problem Relation Age of Onset   • Hypertension Mother    • Diabetes Father    • Diabetes Maternal Grandmother I have reviewed and agree with the history as documented  E-Cigarette/Vaping     E-Cigarette/Vaping Substances     Social History     Tobacco Use   • Smoking status: Never Smoker   • Smokeless tobacco: Never Used   Substance Use Topics   • Alcohol use: No   • Drug use: No       Review of Systems   Constitutional: Negative for chills and fever  HENT: Negative for ear pain and sore throat  Eyes: Negative for pain and visual disturbance  Respiratory: Negative for cough and shortness of breath  Cardiovascular: Negative for chest pain and palpitations  Gastrointestinal: Negative for abdominal pain and vomiting  Genitourinary: Negative for dysuria and hematuria  Musculoskeletal: Negative for arthralgias and back pain  Skin: Positive for color change and wound  Negative for rash  Neurological: Negative for seizures and syncope  All other systems reviewed and are negative  Physical Exam  Physical Exam  Vitals and nursing note reviewed  Constitutional:       Appearance: Normal appearance  He is well-developed  HENT:      Head: Normocephalic and atraumatic  Right Ear: External ear normal       Left Ear: External ear normal       Mouth/Throat:      Mouth: Mucous membranes are moist    Eyes:      Extraocular Movements: Extraocular movements intact  Conjunctiva/sclera: Conjunctivae normal       Pupils: Pupils are equal, round, and reactive to light  Cardiovascular:      Rate and Rhythm: Normal rate and regular rhythm  Pulses: Normal pulses  Heart sounds: Normal heart sounds  No murmur heard  Pulmonary:      Effort: Pulmonary effort is normal  No respiratory distress  Breath sounds: Normal breath sounds  Abdominal:      Palpations: Abdomen is soft  Tenderness: There is no abdominal tenderness  Musculoskeletal:      Cervical back: Neck supple  Comments: Left lower extremity has swelling nonpitting    Has a purpuric rash that does not marychuy to palpation  It is warm and tender to touch  Patient also has a quarter-sized ulceration in the sole of the foot that has no active drainage or no active signs of infection  Does not go all the way to the bone  Skin:     General: Skin is warm and dry  Capillary Refill: Capillary refill takes less than 2 seconds  Neurological:      General: No focal deficit present  Mental Status: He is alert and oriented to person, place, and time  Psychiatric:         Mood and Affect: Mood normal          Thought Content: Thought content normal          Judgment: Judgment normal          Vital Signs  ED Triage Vitals [10/26/22 2306]   Temperature Pulse Respirations Blood Pressure SpO2   98 °F (36 7 °C) 79 18 (!) 211/108 97 %      Temp src Heart Rate Source Patient Position - Orthostatic VS BP Location FiO2 (%)   -- Monitor Sitting Right arm --      Pain Score       --           Vitals:    10/26/22 2306 10/26/22 2345   BP: (!) 211/108 (!) 173/83   Pulse: 79 80   Patient Position - Orthostatic VS: Sitting          Visual Acuity      ED Medications  Medications   ceftriaxone (ROCEPHIN) 2 g/50 mL in dextrose IVPB (2,000 mg Intravenous New Bag 10/26/22 2339)   hydrALAZINE (APRESOLINE) injection 10 mg (10 mg Intravenous Given 10/26/22 2339)       Diagnostic Studies  Results Reviewed     Procedure Component Value Units Date/Time    Lactic acid [[id number]]  (Normal) Collected: 10/26/22 2322    Lab Status: Final result Specimen: Blood from Arm, Right Updated: 10/26/22 2355     LACTIC ACID 1 0 mmol/L     Narrative:      Result may be elevated if tourniquet was used during collection      CBC and differential [[id number]]  (Abnormal) Collected: 10/26/22 2322    Lab Status: Final result Specimen: Blood from Arm, Right Updated: 10/26/22 2336     WBC 7 51 Thousand/uL      RBC 4 51 Million/uL      Hemoglobin 12 7 g/dL      Hematocrit 40 5 %      MCV 90 fL      MCH 28 2 pg      MCHC 31 4 g/dL      RDW 14 3 %      MPV 10 1 fL Platelets 887 Thousands/uL      nRBC 0 /100 WBCs      Neutrophils Relative 52 %      Immat GRANS % 0 %      Lymphocytes Relative 30 %      Monocytes Relative 9 %      Eosinophils Relative 8 %      Basophils Relative 1 %      Neutrophils Absolute 3 89 Thousands/µL      Immature Grans Absolute 0 02 Thousand/uL      Lymphocytes Absolute 2 24 Thousands/µL      Monocytes Absolute 0 67 Thousand/µL      Eosinophils Absolute 0 61 Thousand/µL      Basophils Absolute 0 08 Thousands/µL     Procalcitonin [[id number]] Collected: 10/26/22 2322    Lab Status: In process Specimen: Blood from Arm, Right Updated: 10/26/22 2332    Comprehensive metabolic panel [[id number]] Collected: 10/26/22 2322    Lab Status: In process Specimen: Blood from Arm, Right Updated: 10/26/22 2332    Blood culture #2 [[id number]] Collected: 10/26/22 2322    Lab Status: In process Specimen: Blood from Arm, Right Updated: 10/26/22 2331    Blood culture #1 [[id number]] Collected: 10/26/22 2322    Lab Status:  In process Specimen: Blood from Arm, Right Updated: 10/26/22 2331                 No orders to display              Procedures  Procedures         ED Course  ED Course as of 10/27/22 0000   Wed Oct 26, 2022   2339 WBC: 7 51   2339 Hemoglobin: 12 7   2339 HCT: 40 5   2359 LACTIC ACID: 1 0                                             MDM  Number of Diagnoses or Management Options  Diagnosis management comments: Normal wbc  Normal lactic acid         Amount and/or Complexity of Data Reviewed  Clinical lab tests: ordered and reviewed    Risk of Complications, Morbidity, and/or Mortality  Presenting problems: moderate  Diagnostic procedures: moderate  Management options: moderate    Patient Progress  Patient progress: stable      Disposition  Final diagnoses:   Cellulitis   Cellulitis and abscess of toe of right foot     Time reflects when diagnosis was documented in both MDM as applicable and the Disposition within this note     Time User Action Codes Description Comment    10/26/2022 11:59 PM Denny Matos [L03 90] Cellulitis     10/27/2022 12:00 AM Denny Matos [L03 031,  L02 611] Cellulitis and abscess of toe of right foot       ED Disposition     ED Disposition   Discharge    Condition   Stable    Date/Time   Wed Oct 26, 2022 11:59 PM    [address] discharge to home/self care  Follow-up Information    None         Patient's Medications   Discharge Prescriptions    CEPHALEXIN (KEFLEX) 500 MG CAPSULE    Take 1 capsule (500 mg total) by mouth every 6 (six) hours for 10 days       Start Date: 10/27/2022End Date: 11/6/2022       Order Dose: 500 mg       Quantity: 40 capsule    Refills: 0       No discharge procedures on file      PDMP Review     None          ED Provider  Electronically Signed by           Fan Cardozo MD  10/27/22 0000

## 2022-10-30 LAB
BACTERIA BLD CULT: NORMAL
BACTERIA BLD CULT: NORMAL

## 2022-11-01 LAB
BACTERIA BLD CULT: NORMAL
BACTERIA BLD CULT: NORMAL

## 2024-01-09 ENCOUNTER — HOSPITAL ENCOUNTER (EMERGENCY)
Facility: HOSPITAL | Age: 59
End: 2024-01-10
Attending: EMERGENCY MEDICINE
Payer: COMMERCIAL

## 2024-01-09 ENCOUNTER — APPOINTMENT (EMERGENCY)
Dept: CT IMAGING | Facility: HOSPITAL | Age: 59
End: 2024-01-09
Payer: COMMERCIAL

## 2024-01-09 DIAGNOSIS — S22.49XA RIB FRACTURES: ICD-10-CM

## 2024-01-09 DIAGNOSIS — L08.9 DIABETIC FOOT INFECTION: Primary | ICD-10-CM

## 2024-01-09 DIAGNOSIS — E11.628 DIABETIC FOOT INFECTION: Primary | ICD-10-CM

## 2024-01-09 DIAGNOSIS — S42.102A CLOSED LEFT SCAPULAR FRACTURE: ICD-10-CM

## 2024-01-09 LAB
ALBUMIN SERPL BCP-MCNC: 4.2 G/DL (ref 3.5–5)
ALP SERPL-CCNC: 98 U/L (ref 34–104)
ALT SERPL W P-5'-P-CCNC: 14 U/L (ref 7–52)
ANION GAP SERPL CALCULATED.3IONS-SCNC: 7 MMOL/L
AST SERPL W P-5'-P-CCNC: 16 U/L (ref 13–39)
BASOPHILS # BLD AUTO: 0.03 THOUSANDS/ÂΜL (ref 0–0.1)
BASOPHILS NFR BLD AUTO: 0 % (ref 0–1)
BILIRUB SERPL-MCNC: 0.56 MG/DL (ref 0.2–1)
BUN SERPL-MCNC: 27 MG/DL (ref 5–25)
CALCIUM SERPL-MCNC: 9.2 MG/DL (ref 8.4–10.2)
CHLORIDE SERPL-SCNC: 104 MMOL/L (ref 96–108)
CO2 SERPL-SCNC: 24 MMOL/L (ref 21–32)
CREAT SERPL-MCNC: 1.62 MG/DL (ref 0.6–1.3)
EOSINOPHIL # BLD AUTO: 0.48 THOUSAND/ÂΜL (ref 0–0.61)
EOSINOPHIL NFR BLD AUTO: 5 % (ref 0–6)
ERYTHROCYTE [DISTWIDTH] IN BLOOD BY AUTOMATED COUNT: 14.6 % (ref 11.6–15.1)
FLUAV RNA RESP QL NAA+PROBE: NEGATIVE
FLUBV RNA RESP QL NAA+PROBE: NEGATIVE
GFR SERPL CREATININE-BSD FRML MDRD: 46 ML/MIN/1.73SQ M
GLUCOSE SERPL-MCNC: 172 MG/DL (ref 65–140)
HCT VFR BLD AUTO: 43.3 % (ref 36.5–49.3)
HGB BLD-MCNC: 13.8 G/DL (ref 12–17)
IMM GRANULOCYTES # BLD AUTO: 0.02 THOUSAND/UL (ref 0–0.2)
IMM GRANULOCYTES NFR BLD AUTO: 0 % (ref 0–2)
LYMPHOCYTES # BLD AUTO: 0.87 THOUSANDS/ÂΜL (ref 0.6–4.47)
LYMPHOCYTES NFR BLD AUTO: 10 % (ref 14–44)
MCH RBC QN AUTO: 28 PG (ref 26.8–34.3)
MCHC RBC AUTO-ENTMCNC: 31.9 G/DL (ref 31.4–37.4)
MCV RBC AUTO: 88 FL (ref 82–98)
MONOCYTES # BLD AUTO: 0.57 THOUSAND/ÂΜL (ref 0.17–1.22)
MONOCYTES NFR BLD AUTO: 6 % (ref 4–12)
NEUTROPHILS # BLD AUTO: 6.94 THOUSANDS/ÂΜL (ref 1.85–7.62)
NEUTS SEG NFR BLD AUTO: 79 % (ref 43–75)
NRBC BLD AUTO-RTO: 0 /100 WBCS
PLATELET # BLD AUTO: 239 THOUSANDS/UL (ref 149–390)
PMV BLD AUTO: 9.7 FL (ref 8.9–12.7)
POTASSIUM SERPL-SCNC: 4.4 MMOL/L (ref 3.5–5.3)
PROT SERPL-MCNC: 8.5 G/DL (ref 6.4–8.4)
RBC # BLD AUTO: 4.93 MILLION/UL (ref 3.88–5.62)
RSV RNA RESP QL NAA+PROBE: NEGATIVE
SARS-COV-2 RNA RESP QL NAA+PROBE: NEGATIVE
SODIUM SERPL-SCNC: 135 MMOL/L (ref 135–147)
WBC # BLD AUTO: 8.91 THOUSAND/UL (ref 4.31–10.16)

## 2024-01-09 PROCEDURE — 85025 COMPLETE CBC W/AUTO DIFF WBC: CPT | Performed by: EMERGENCY MEDICINE

## 2024-01-09 PROCEDURE — 70450 CT HEAD/BRAIN W/O DYE: CPT

## 2024-01-09 PROCEDURE — 80053 COMPREHEN METABOLIC PANEL: CPT | Performed by: EMERGENCY MEDICINE

## 2024-01-09 PROCEDURE — 96365 THER/PROPH/DIAG IV INF INIT: CPT

## 2024-01-09 PROCEDURE — 73701 CT LOWER EXTREMITY W/DYE: CPT

## 2024-01-09 PROCEDURE — 71260 CT THORAX DX C+: CPT

## 2024-01-09 PROCEDURE — 72125 CT NECK SPINE W/O DYE: CPT

## 2024-01-09 PROCEDURE — G1004 CDSM NDSC: HCPCS

## 2024-01-09 PROCEDURE — 87040 BLOOD CULTURE FOR BACTERIA: CPT | Performed by: EMERGENCY MEDICINE

## 2024-01-09 PROCEDURE — 96367 TX/PROPH/DG ADDL SEQ IV INF: CPT

## 2024-01-09 PROCEDURE — 0241U HB NFCT DS VIR RESP RNA 4 TRGT: CPT | Performed by: EMERGENCY MEDICINE

## 2024-01-09 PROCEDURE — 99284 EMERGENCY DEPT VISIT MOD MDM: CPT

## 2024-01-09 PROCEDURE — 36415 COLL VENOUS BLD VENIPUNCTURE: CPT | Performed by: EMERGENCY MEDICINE

## 2024-01-09 RX ORDER — FENTANYL CITRATE 50 UG/ML
50 INJECTION, SOLUTION INTRAMUSCULAR; INTRAVENOUS ONCE
Status: COMPLETED | OUTPATIENT
Start: 2024-01-09 | End: 2024-01-10

## 2024-01-09 RX ORDER — METRONIDAZOLE 500 MG/100ML
500 INJECTION, SOLUTION INTRAVENOUS EVERY 8 HOURS
Status: DISCONTINUED | OUTPATIENT
Start: 2024-01-09 | End: 2024-01-10 | Stop reason: HOSPADM

## 2024-01-09 RX ORDER — CEFEPIME HYDROCHLORIDE 2 G/50ML
2000 INJECTION, SOLUTION INTRAVENOUS ONCE
Status: COMPLETED | OUTPATIENT
Start: 2024-01-09 | End: 2024-01-09

## 2024-01-09 RX ADMIN — VANCOMYCIN HYDROCHLORIDE 1250 MG: 5 INJECTION, POWDER, LYOPHILIZED, FOR SOLUTION INTRAVENOUS at 22:51

## 2024-01-09 RX ADMIN — IOHEXOL 100 ML: 350 INJECTION, SOLUTION INTRAVENOUS at 20:24

## 2024-01-09 RX ADMIN — CEFEPIME HYDROCHLORIDE 2000 MG: 2 INJECTION, SOLUTION INTRAVENOUS at 22:00

## 2024-01-09 RX ADMIN — METRONIDAZOLE 500 MG: 500 INJECTION, SOLUTION INTRAVENOUS at 22:33
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
## 2024-01-10 ENCOUNTER — HOSPITAL ENCOUNTER (INPATIENT)
Facility: HOSPITAL | Age: 59
LOS: 1 days | Discharge: HOME | DRG: 184 | End: 2024-01-10
Attending: SURGERY | Admitting: SURGERY
Payer: COMMERCIAL

## 2024-01-10 ENCOUNTER — APPOINTMENT (INPATIENT)
Dept: RADIOLOGY | Facility: HOSPITAL | Age: 59
DRG: 184 | End: 2024-01-10
Payer: COMMERCIAL

## 2024-01-10 ENCOUNTER — APPOINTMENT (EMERGENCY)
Dept: RADIOLOGY | Facility: HOSPITAL | Age: 59
DRG: 184 | End: 2024-01-10
Payer: COMMERCIAL

## 2024-01-10 VITALS
DIASTOLIC BLOOD PRESSURE: 61 MMHG | OXYGEN SATURATION: 92 % | TEMPERATURE: 101.8 F | SYSTOLIC BLOOD PRESSURE: 102 MMHG | HEART RATE: 69 BPM | RESPIRATION RATE: 16 BRPM

## 2024-01-10 VITALS
WEIGHT: 232 LBS | SYSTOLIC BLOOD PRESSURE: 188 MMHG | OXYGEN SATURATION: 99 % | RESPIRATION RATE: 18 BRPM | BODY MASS INDEX: 34.36 KG/M2 | HEIGHT: 69 IN | HEART RATE: 83 BPM | DIASTOLIC BLOOD PRESSURE: 88 MMHG | TEMPERATURE: 100.3 F

## 2024-01-10 DIAGNOSIS — M86.272 SUBACUTE OSTEOMYELITIS OF LEFT FOOT (HCC): ICD-10-CM

## 2024-01-10 DIAGNOSIS — S42.145A CLOSED NONDISPLACED FRACTURE OF GLENOID CAVITY OF LEFT SCAPULA, INITIAL ENCOUNTER: ICD-10-CM

## 2024-01-10 DIAGNOSIS — S82.302A CLOSED FRACTURE OF DISTAL END OF LEFT TIBIA, UNSPECIFIED FRACTURE MORPHOLOGY, INITIAL ENCOUNTER: Primary | ICD-10-CM

## 2024-01-10 DIAGNOSIS — S91.302A WOUND OF LEFT FOOT: ICD-10-CM

## 2024-01-10 PROBLEM — S22.43XA CLOSED FRACTURE OF MULTIPLE RIBS OF BOTH SIDES: Status: ACTIVE | Noted: 2024-01-10

## 2024-01-10 PROBLEM — S82.309A CLOSED FRACTURE OF DISTAL END OF TIBIA: Status: ACTIVE | Noted: 2024-01-10

## 2024-01-10 PROBLEM — S42.109A SCAPULA FRACTURE: Status: ACTIVE | Noted: 2024-01-10

## 2024-01-10 LAB
ANION GAP SERPL CALCULATED.3IONS-SCNC: 9 MMOL/L
BASOPHILS # BLD AUTO: 0.04 THOUSANDS/ÂΜL (ref 0–0.1)
BASOPHILS NFR BLD AUTO: 1 % (ref 0–1)
BUN SERPL-MCNC: 22 MG/DL (ref 5–25)
CALCIUM SERPL-MCNC: 8.8 MG/DL (ref 8.4–10.2)
CHLORIDE SERPL-SCNC: 104 MMOL/L (ref 96–108)
CO2 SERPL-SCNC: 24 MMOL/L (ref 21–32)
CREAT SERPL-MCNC: 1.39 MG/DL (ref 0.6–1.3)
DME PARACHUTE DELIVERY DATE REQUESTED: NORMAL
DME PARACHUTE ITEM DESCRIPTION: NORMAL
DME PARACHUTE ITEM DESCRIPTION: NORMAL
DME PARACHUTE ORDER STATUS: NORMAL
DME PARACHUTE SUPPLIER NAME: NORMAL
DME PARACHUTE SUPPLIER PHONE: NORMAL
EOSINOPHIL # BLD AUTO: 0.3 THOUSAND/ÂΜL (ref 0–0.61)
EOSINOPHIL NFR BLD AUTO: 4 % (ref 0–6)
ERYTHROCYTE [DISTWIDTH] IN BLOOD BY AUTOMATED COUNT: 14.7 % (ref 11.6–15.1)
EST. AVERAGE GLUCOSE BLD GHB EST-MCNC: 177 MG/DL
GFR SERPL CREATININE-BSD FRML MDRD: 55 ML/MIN/1.73SQ M
GLUCOSE SERPL-MCNC: 133 MG/DL (ref 65–140)
GLUCOSE SERPL-MCNC: 136 MG/DL (ref 65–140)
GLUCOSE SERPL-MCNC: 151 MG/DL (ref 65–140)
GLUCOSE SERPL-MCNC: 174 MG/DL (ref 65–140)
GLUCOSE SERPL-MCNC: 186 MG/DL (ref 65–140)
GLUCOSE SERPL-MCNC: 467 MG/DL (ref 65–140)
HBA1C MFR BLD: 7.8 %
HCT VFR BLD AUTO: 39.9 % (ref 36.5–49.3)
HGB BLD-MCNC: 12.6 G/DL (ref 12–17)
IMM GRANULOCYTES # BLD AUTO: 0.04 THOUSAND/UL (ref 0–0.2)
IMM GRANULOCYTES NFR BLD AUTO: 1 % (ref 0–2)
LYMPHOCYTES # BLD AUTO: 0.8 THOUSANDS/ÂΜL (ref 0.6–4.47)
LYMPHOCYTES NFR BLD AUTO: 9 % (ref 14–44)
MAGNESIUM SERPL-MCNC: 1.9 MG/DL (ref 1.9–2.7)
MCH RBC QN AUTO: 27.8 PG (ref 26.8–34.3)
MCHC RBC AUTO-ENTMCNC: 31.6 G/DL (ref 31.4–37.4)
MCV RBC AUTO: 88 FL (ref 82–98)
MONOCYTES # BLD AUTO: 0.6 THOUSAND/ÂΜL (ref 0.17–1.22)
MONOCYTES NFR BLD AUTO: 7 % (ref 4–12)
NEUTROPHILS # BLD AUTO: 6.82 THOUSANDS/ÂΜL (ref 1.85–7.62)
NEUTS SEG NFR BLD AUTO: 78 % (ref 43–75)
NRBC BLD AUTO-RTO: 0 /100 WBCS
PHOSPHATE SERPL-MCNC: 3.3 MG/DL (ref 2.7–4.5)
PLATELET # BLD AUTO: 237 THOUSANDS/UL (ref 149–390)
PLATELET # BLD AUTO: 244 THOUSANDS/UL (ref 149–390)
PMV BLD AUTO: 10.7 FL (ref 8.9–12.7)
PMV BLD AUTO: 10.7 FL (ref 8.9–12.7)
POTASSIUM SERPL-SCNC: 4.2 MMOL/L (ref 3.5–5.3)
RBC # BLD AUTO: 4.54 MILLION/UL (ref 3.88–5.62)
SODIUM SERPL-SCNC: 137 MMOL/L (ref 135–147)
WBC # BLD AUTO: 8.6 THOUSAND/UL (ref 4.31–10.16)

## 2024-01-10 PROCEDURE — 97167 OT EVAL HIGH COMPLEX 60 MIN: CPT

## 2024-01-10 PROCEDURE — 27760 CLTX MEDIAL ANKLE FX: CPT | Performed by: ORTHOPAEDIC SURGERY

## 2024-01-10 PROCEDURE — 73030 X-RAY EXAM OF SHOULDER: CPT

## 2024-01-10 PROCEDURE — 82948 REAGENT STRIP/BLOOD GLUCOSE: CPT

## 2024-01-10 PROCEDURE — 99285 EMERGENCY DEPT VISIT HI MDM: CPT | Performed by: EMERGENCY MEDICINE

## 2024-01-10 PROCEDURE — NC001 PR NO CHARGE: Performed by: ORTHOPAEDIC SURGERY

## 2024-01-10 PROCEDURE — 85025 COMPLETE CBC W/AUTO DIFF WBC: CPT

## 2024-01-10 PROCEDURE — 83735 ASSAY OF MAGNESIUM: CPT

## 2024-01-10 PROCEDURE — NC001 PR NO CHARGE: Performed by: NURSE PRACTITIONER

## 2024-01-10 PROCEDURE — 99284 EMERGENCY DEPT VISIT MOD MDM: CPT

## 2024-01-10 PROCEDURE — 73610 X-RAY EXAM OF ANKLE: CPT

## 2024-01-10 PROCEDURE — 97163 PT EVAL HIGH COMPLEX 45 MIN: CPT

## 2024-01-10 PROCEDURE — 96375 TX/PRO/DX INJ NEW DRUG ADDON: CPT

## 2024-01-10 PROCEDURE — 36415 COLL VENOUS BLD VENIPUNCTURE: CPT

## 2024-01-10 PROCEDURE — 99236 HOSP IP/OBS SAME DATE HI 85: CPT | Performed by: STUDENT IN AN ORGANIZED HEALTH CARE EDUCATION/TRAINING PROGRAM

## 2024-01-10 PROCEDURE — 99222 1ST HOSP IP/OBS MODERATE 55: CPT | Performed by: ORTHOPAEDIC SURGERY

## 2024-01-10 PROCEDURE — 84100 ASSAY OF PHOSPHORUS: CPT

## 2024-01-10 PROCEDURE — 83036 HEMOGLOBIN GLYCOSYLATED A1C: CPT

## 2024-01-10 PROCEDURE — 85049 AUTOMATED PLATELET COUNT: CPT

## 2024-01-10 PROCEDURE — 23570 CLTX SCAPULAR FX W/O MNPJ: CPT | Performed by: ORTHOPAEDIC SURGERY

## 2024-01-10 PROCEDURE — 80048 BASIC METABOLIC PNL TOTAL CA: CPT

## 2024-01-10 RX ORDER — GUAIFENESIN 600 MG/1
600 TABLET, EXTENDED RELEASE ORAL EVERY 12 HOURS SCHEDULED
Status: DISCONTINUED | OUTPATIENT
Start: 2024-01-10 | End: 2024-01-10 | Stop reason: HOSPADM

## 2024-01-10 RX ORDER — ALBUTEROL SULFATE 90 UG/1
2 AEROSOL, METERED RESPIRATORY (INHALATION) EVERY 6 HOURS PRN
Status: DISCONTINUED | OUTPATIENT
Start: 2024-01-10 | End: 2024-01-10 | Stop reason: HOSPADM

## 2024-01-10 RX ORDER — AMOXICILLIN 250 MG
1 CAPSULE ORAL
Qty: 30 TABLET | Refills: 0 | Status: SHIPPED | OUTPATIENT
Start: 2024-01-10

## 2024-01-10 RX ORDER — ASPIRIN 81 MG/1
81 TABLET, CHEWABLE ORAL 2 TIMES DAILY
Qty: 56 TABLET | Refills: 0 | Status: SHIPPED | OUTPATIENT
Start: 2024-01-10

## 2024-01-10 RX ORDER — ENOXAPARIN SODIUM 100 MG/ML
30 INJECTION SUBCUTANEOUS EVERY 12 HOURS
Status: DISCONTINUED | OUTPATIENT
Start: 2024-01-10 | End: 2024-01-10 | Stop reason: HOSPADM

## 2024-01-10 RX ORDER — ACETYLCYSTEINE 200 MG/ML
3 SOLUTION ORAL; RESPIRATORY (INHALATION)
Status: DISCONTINUED | OUTPATIENT
Start: 2024-01-10 | End: 2024-01-10 | Stop reason: HOSPADM

## 2024-01-10 RX ORDER — INSULIN GLARGINE 100 [IU]/ML
24 INJECTION, SOLUTION SUBCUTANEOUS EVERY MORNING
Status: DISCONTINUED | OUTPATIENT
Start: 2024-01-10 | End: 2024-01-10 | Stop reason: HOSPADM

## 2024-01-10 RX ORDER — ACETAMINOPHEN 325 MG/1
975 TABLET ORAL EVERY 8 HOURS SCHEDULED
Status: DISCONTINUED | OUTPATIENT
Start: 2024-01-10 | End: 2024-01-10 | Stop reason: HOSPADM

## 2024-01-10 RX ORDER — GABAPENTIN 100 MG/1
100 CAPSULE ORAL 3 TIMES DAILY
Status: DISCONTINUED | OUTPATIENT
Start: 2024-01-10 | End: 2024-01-10 | Stop reason: HOSPADM

## 2024-01-10 RX ORDER — INSULIN LISPRO 100 [IU]/ML
8 INJECTION, SOLUTION INTRAVENOUS; SUBCUTANEOUS
Status: DISCONTINUED | OUTPATIENT
Start: 2024-01-10 | End: 2024-01-10 | Stop reason: HOSPADM

## 2024-01-10 RX ORDER — OXYCODONE HYDROCHLORIDE 5 MG/1
TABLET ORAL
Qty: 20 TABLET | Refills: 0 | Status: SHIPPED | OUTPATIENT
Start: 2024-01-10

## 2024-01-10 RX ORDER — OXYCODONE HYDROCHLORIDE 5 MG/1
5 TABLET ORAL EVERY 4 HOURS PRN
Status: DISCONTINUED | OUTPATIENT
Start: 2024-01-10 | End: 2024-01-10 | Stop reason: HOSPADM

## 2024-01-10 RX ORDER — HYDROMORPHONE HCL IN WATER/PF 6 MG/30 ML
0.2 PATIENT CONTROLLED ANALGESIA SYRINGE INTRAVENOUS EVERY 2 HOUR PRN
Status: DISCONTINUED | OUTPATIENT
Start: 2024-01-10 | End: 2024-01-10

## 2024-01-10 RX ORDER — ONDANSETRON 2 MG/ML
4 INJECTION INTRAMUSCULAR; INTRAVENOUS EVERY 4 HOURS PRN
Status: DISCONTINUED | OUTPATIENT
Start: 2024-01-10 | End: 2024-01-10 | Stop reason: HOSPADM

## 2024-01-10 RX ORDER — LABETALOL HYDROCHLORIDE 5 MG/ML
10 INJECTION, SOLUTION INTRAVENOUS EVERY 4 HOURS PRN
Status: DISCONTINUED | OUTPATIENT
Start: 2024-01-10 | End: 2024-01-10 | Stop reason: HOSPADM

## 2024-01-10 RX ORDER — ACETAMINOPHEN 325 MG/1
975 TABLET ORAL EVERY 8 HOURS PRN
Start: 2024-01-10

## 2024-01-10 RX ORDER — INSULIN LISPRO 100 [IU]/ML
1-6 INJECTION, SOLUTION INTRAVENOUS; SUBCUTANEOUS
Status: DISCONTINUED | OUTPATIENT
Start: 2024-01-10 | End: 2024-01-10 | Stop reason: HOSPADM

## 2024-01-10 RX ORDER — METOPROLOL TARTRATE 50 MG/1
50 TABLET, FILM COATED ORAL EVERY 12 HOURS SCHEDULED
Status: DISCONTINUED | OUTPATIENT
Start: 2024-01-10 | End: 2024-01-10 | Stop reason: HOSPADM

## 2024-01-10 RX ORDER — AMOXICILLIN 250 MG
1 CAPSULE ORAL
Status: DISCONTINUED | OUTPATIENT
Start: 2024-01-10 | End: 2024-01-10 | Stop reason: HOSPADM

## 2024-01-10 RX ADMIN — GABAPENTIN 100 MG: 100 CAPSULE ORAL at 09:05

## 2024-01-10 RX ADMIN — GABAPENTIN 100 MG: 100 CAPSULE ORAL at 16:22

## 2024-01-10 RX ADMIN — INSULIN LISPRO 6 UNITS: 100 INJECTION, SOLUTION INTRAVENOUS; SUBCUTANEOUS at 16:35

## 2024-01-10 RX ADMIN — OXYCODONE HYDROCHLORIDE 5 MG: 5 TABLET ORAL at 09:19

## 2024-01-10 RX ADMIN — GUAIFENESIN 600 MG: 600 TABLET, EXTENDED RELEASE ORAL at 02:26

## 2024-01-10 RX ADMIN — ENOXAPARIN SODIUM 30 MG: 30 INJECTION SUBCUTANEOUS at 14:16

## 2024-01-10 RX ADMIN — INSULIN LISPRO 8 UNITS: 100 INJECTION, SOLUTION INTRAVENOUS; SUBCUTANEOUS at 12:42

## 2024-01-10 RX ADMIN — METOPROLOL TARTRATE 50 MG: 50 TABLET ORAL at 14:19

## 2024-01-10 RX ADMIN — FENTANYL CITRATE 50 MCG: 50 INJECTION INTRAMUSCULAR; INTRAVENOUS at 00:10

## 2024-01-10 RX ADMIN — INSULIN GLARGINE 24 UNITS: 100 INJECTION, SOLUTION SUBCUTANEOUS at 09:19

## 2024-01-10 RX ADMIN — ENOXAPARIN SODIUM 30 MG: 30 INJECTION SUBCUTANEOUS at 02:26

## 2024-01-10 RX ADMIN — ACETAMINOPHEN 975 MG: 325 TABLET, FILM COATED ORAL at 14:16

## 2024-01-10 RX ADMIN — GUAIFENESIN 600 MG: 600 TABLET, EXTENDED RELEASE ORAL at 14:19

## 2024-01-10 RX ADMIN — METOPROLOL TARTRATE 50 MG: 50 TABLET ORAL at 02:25

## 2024-01-10 RX ADMIN — INSULIN LISPRO 8 UNITS: 100 INJECTION, SOLUTION INTRAVENOUS; SUBCUTANEOUS at 16:23

## 2024-01-10 RX ADMIN — ACETYLCYSTEINE 600 MG: 200 SOLUTION ORAL; RESPIRATORY (INHALATION) at 02:26

## 2024-01-10 RX ADMIN — ACETAMINOPHEN 975 MG: 325 TABLET, FILM COATED ORAL at 05:24

## 2024-01-10 NOTE — LETTER
[facility] [address]  [address]  [place] [address]  Dept: [phone]    January 10, 2024     Patient: Lev Sousa   YOB: 1965   Date of Visit: 1/10/2024       To Whom it May Concern:    Lev Sousa is under my professional care. He was seen in the hospital from 1/10/2024 to 01/10/24. He must remain out of work until at least 2/10/2023.      If you have any questions or concerns, please don't hesitate to call.         Sincerely,          JAS Longo
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
## 2024-01-10 NOTE — RESPIRATORY THERAPY NOTE
RT Protocol Note  Lev Sousa 58 y.o. male MRN: [id number]  Unit/Bed#: ED 16 Encounter: [id number]    Assessment    Active Problems:    Diabetes mellitus (HCC)    Hypertension    Hyperlipidemia    Osteomyelitis (HCC)    Closed fracture of multiple ribs of both sides    Scapula fracture    Subacute osteomyelitis of left foot (HCC)    Closed fracture of distal end of tibia      Home Pulmonary Medications:  N/A       Past Medical History:   Diagnosis Date    Diabetes mellitus (HCC)     GERD (gastroesophageal reflux disease)     Hyperlipidemia     Hypertension     Neuropathy      Social History     Socioeconomic History    Marital status: /Civil Union     Spouse name: None    Number of children: None    Years of education: None    Highest education level: None   Occupational History    None   Tobacco Use    Smoking status: Never    Smokeless tobacco: Never   Vaping Use    Vaping status: Never Used   Substance and Sexual Activity    Alcohol use: No    Drug use: No    Sexual activity: Yes     Partners: Female   Other Topics Concern    None   Social History Narrative    None     Social Determinants of Health     Financial Resource Strain: Not on file   Food Insecurity: Not on file   Transportation Needs: Not on file   Physical Activity: Not on file   Stress: Not on file   Social Connections: Not on file   Intimate Partner Violence: Not on file   Housing Stability: Not on file       Subjective         Objective    Physical Exam:   Assessment Type: (P) Assess only  General Appearance: (P) Awake  Respiratory Pattern: (P) Normal, Symmetrical, Spontaneous  Chest Assessment: (P) Chest expansion symmetrical  Bilateral Breath Sounds: (P) Clear    Vitals:  Blood pressure (!) 186/79, pulse 86, temperature 99.5 °F (37.5 °C), temperature source Tympanic, resp. rate 18, SpO2 95%.          Imaging and other studies:           Plan    Respiratory Plan: (P) Discontinue Protocol  Airway Clearance Plan: (P) Incentive Spirometer      Resp Comments: (P) Pt has no pulm hx, IS given
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
## 2024-01-10 NOTE — ASSESSMENT & PLAN NOTE
Lab Results   Component Value Date    HGBA1C 10.0 (H) 08/01/2023   Poorly controlled diabetic, hx of neuropathy, hx of charcot arthopathy  Begin SSI w/ basal and meal time coverage

## 2024-01-10 NOTE — NURSING NOTE
Provider called to bedside, Naveen Adams, as patient developed a slight temperature. Temperature treated. Provider accessed patient and reported he is ok for discharge as per former plan.     Olivia
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
## 2024-01-10 NOTE — DISCHARGE INSTR - AVS FIRST PAGE
Discharge Instructions - Podiatry    Weight Bearing Status: Non-weight bearing left lower extremity in a posterior splint per Orthopedic surgery                    Pain: Continue analgesics as directed    Follow-up appointment instructions: Please make an appointment within one week of discharge with Dr. Arora . Contact sooner if any increase in pain, or signs of infection occur    Wound Care: Leave dressings clean, dry, and intact between professional dressing changes    Nursing Instructions: Please apply Maxsorb and Adaptic. Then cover with Gauze and secure with Kerlix and tape. Please change dressing every Monday, Wednesday, and Friday .       Traumatic Rib Fracture Discharge Instructions:    Your rib fractures will take time to heal. Rib fractures typically take at least 6-8 weeks to heal and may take longer.    Activity:  - Walking and normal light activities are encouraged. Normal daily activities including climbing steps are okay.  - Avoid lifting greater than 10 pounds, any strenuous activities and/or exercise, and contact sports until cleared by the trauma service.  - Continue using the incentive spirometer at least 10 times every hour while awake.    Return to work:    - You may return to work once you are cleared by the trauma service.    Medications:    - You should continue your current medication regimen after discharge unless otherwise instructed. Please refer to your discharge medication list for further details.  - Please take the pain medications as directed.  - You are encouraged to use non-narcotic pain medications first and whenever possible. Reserve the use of narcotic pain medication for moderate to severe pain not controlled by non-narcotic medications.  - No driving while taking narcotic pain medications.  - You may become constipated, especially if taking pain medications. You may take any over the counter stool softeners or laxatives as needed. Examples: Milk of Magnesia, Colace,  Stephanie.    Additional Instructions:  - If you have any questions or concerns after discharge please call the office.  - Call office or return to ER if fever greater than 101, chills, worsening/uncontrollable pain, develop productive cough, increasing shortness of breath, and/or difficulty breathing.        Discharge Instructions - Orthopedics  Lev Sousa 58 y.o. male MRN: [id number]  Unit/Bed#: [facility] 623-01    Weight Bearing Status:                                           Non-weight bearing left lower extremity    DVT prophylaxis:  Aspirin 81 mg twice per day    Pain:  Continue analgesics as directed    Dressing Instructions:   Please keep clean, dry and intact until follow up     Appt Instructions:   If you do not have your appointment, please call the clinic at [phone]  Otherwise follow up as scheduled.    Contact the office sooner if you experience any increased numbness/tingling in the extremities.
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
## 2024-01-10 NOTE — EMTALA/ACUTE CARE TRANSFER
[facility] EMERGENCY DEPARTMENT  [address]  [place] [address]  Dept: [phone]      EMTALA TRANSFER CONSENT    NAME Lev GLYNN 1965                              MRN [id number]    I have been informed of my rights regarding examination, treatment, and transfer   by Dr. Vasu Winters MD    Benefits: Specialized equipment and/or services available at the receiving facility (Include comment)________________________    Risks: Potential for delay in receiving treatment      Transfer Request   I acknowledge that my medical condition has been evaluated and explained to me by the emergency department physician or other qualified medical person and/or my attending physician who has recommended and offered to me further medical examination and treatment. I understand the Hospital's obligation with respect to the treatment and stabilization of my emergency medical condition. I nevertheless request to be transferred. I release the Hospital, the doctor, and any other persons caring for me from all responsibility or liability for any injury or ill effects that may result from my transfer and agree to accept all responsibility for the consequences of my choice to transfer, rather than receive stabilizing treatment at the Hospital. I understand that because the transfer is my request, my insurance may not provide reimbursement for the services.  The Hospital will assist and direct me and my family in how to make arrangements for transfer, but the hospital is not liable for any fees charged by the transport service.  In spite of this understanding, I refuse to consent to further medical examination and treatment which has been offered to me, and request transfer to Accepting Facility Name, City & State : [facility]. I authorize the performance of emergency medical procedures and treatments upon me in both transit and upon arrival at the receiving  facility.  Additionally, I authorize the release of any and all medical records to the receiving facility and request they be transported with me, if possible.    I authorize the performance of emergency medical procedures and treatments upon me in both transit and upon arrival at the receiving facility.  Additionally, I authorize the release of any and all medical records to the receiving facility and request they be transported with me, if possible.  I understand that the safest mode of transportation during a medical emergency is an ambulance and that the Hospital advocates the use of this mode of transport. Risks of traveling to the receiving facility by car, including absence of medical control, life sustaining equipment, such as oxygen, and medical personnel has been explained to me and I fully understand them.    (ROCIO CORRECT BOX BELOW)  [  ]  I consent to the stated transfer and to be transported by ambulance/helicopter.  [  ]  I consent to the stated transfer, but refuse transportation by ambulance and accept full responsibility for my transportation by car.  I understand the risks of non-ambulance transfers and I exonerate the Hospital and its staff from any deterioration in my condition that results from this refusal.    X___________________________________________    DATE  24  TIME________  Signature of patient or legally responsible individual signing on patient behalf           RELATIONSHIP TO PATIENT_________________________          Provider Certification    NAME Lev Sousa                                        [facility] 1965                              MRN [id number]    A medical screening exam was performed on the above named patient.  Based on the examination:    Condition Necessitating Transfer The primary encounter diagnosis was Diabetic foot infection . Diagnoses of Rib fractures and Closed left scapular fracture were also pertinent to this visit.    Patient Condition: The patient  has been stabilized such that within reasonable medical probability, no material deterioration of the patient condition or the condition of the unborn child(rahel) is likely to result from the transfer    Reason for Transfer: Level of Care needed not available at this facility    Transfer Requirements: Facility SLB   Space available and qualified personnel available for treatment as acknowledged by    Agreed to accept transfer and to provide appropriate medical treatment as acknowledged by       Dr. Montoya  Appropriate medical records of the examination and treatment of the patient are provided at the time of transfer   STAFF INITIAL WHEN COMPLETED _______  Transfer will be performed by qualified personnel from    and appropriate transfer equipment as required, including the use of necessary and appropriate life support measures.    Provider Certification: I have examined the patient and explained the following risks and benefits of being transferred/refusing transfer to the patient/family:         Based on these reasonable risks and benefits to the patient and/or the unborn child(rahel), and based upon the information available at the time of the patient’s examination, I certify that the medical benefits reasonably to be expected from the provision of appropriate medical treatments at another medical facility outweigh the increasing risks, if any, to the individual’s medical condition, and in the case of labor to the unborn child, from effecting the transfer.    X____________________________________________ DATE 01/09/24        TIME_______      ORIGINAL - SEND TO MEDICAL RECORDS   COPY - SEND WITH PATIENT DURING TRANSFER
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
## 2024-01-10 NOTE — ASSESSMENT & PLAN NOTE
Podiatry c/s  Local wound care  Gauze, kerlix and ace wrap  Tight glycemic control  Monitor fever curve, WBC
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
## 2024-01-11 NOTE — UTILIZATION REVIEW
Higher than average risk for surgery  No contraindication for surgery  Continue all medication postoperatively    Call for any perioperative problems NOTIFICATION OF INPATIENT ADMISSION   AUTHORIZATION REQUEST   SERVICING FACILITY:   [facility]  Address: [address] [place] [address]  Tax ID: [id number]  NPI: [id number] ATTENDING PROVIDER:  Attending Name and NPI#: Tomas Montoya Md [[id number]]  Address: [address] [place] [address]  Phone: [phone]   ADMISSION INFORMATION:  Place of Service: Inpatient Acute Care Hospital  Place of Service Code: 21  Inpatient Admission Date/Time: 1/10/24  2:13 AM  Discharge Date/Time: 1/10/2024  7:38 PM  Admitting Diagnosis Code/Description:  Closed nondisplaced fracture of glenoid cavity of left scapula, initial encounter [S42.145A]  Subacute osteomyelitis of left foot (HCC) [M86.272]  Closed fracture of distal end of left tibia, unspecified fracture morphology, initial encounter [S82.302A]  Unspecified multiple injuries, initial encounter [T07.XXXA]  Wound of left foot [S91.302A]     UTILIZATION REVIEW CONTACT:  Miguel A Chen Utilization   Network Utilization Review Department  Phone: [phone]  Fax: [phone]  Email: [email or web address]  Contact for approvals/pending authorizations, clinical reviews, and discharge.     PHYSICIAN ADVISORY SERVICES:  Medical Necessity Denial & Ofue-ap-Hict Review  Phone: [phone]  Fax: [phone]  Email: [email or web address]     DISCHARGE SUPPORT TEAM:  For Patients Discharge Needs & Updates  Phone: [phone] opt. 2 Fax: [phone]  Email: [email or web address]
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
## 2024-01-14 LAB
BACTERIA BLD CULT: NORMAL
BACTERIA BLD CULT: NORMAL
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
## 2024-02-13 LAB
DME PARACHUTE DELIVERY DATE ACTUAL: NORMAL
DME PARACHUTE DELIVERY DATE REQUESTED: NORMAL
DME PARACHUTE ITEM DESCRIPTION: NORMAL
DME PARACHUTE ITEM DESCRIPTION: NORMAL
DME PARACHUTE ORDER STATUS: NORMAL
DME PARACHUTE SUPPLIER NAME: NORMAL
DME PARACHUTE SUPPLIER PHONE: NORMAL

## 2024-07-26 ENCOUNTER — TELEPHONE (OUTPATIENT)
Age: 59
End: 2024-07-26

## 2024-07-26 NOTE — TELEPHONE ENCOUNTER
Patients GI provider:  Dr. Phillips    Number to return call: [phone]  Fax: [phone]    Reason for call: Gloria from Dr. Painting's office called in requesting last colonoscopy report perform by Dr. Phillips. Please fax over report to the number above, thank you.    Scheduled procedure/appointment date if applicable: Apt/procedure n/a